# Patient Record
Sex: MALE | Race: WHITE | NOT HISPANIC OR LATINO
[De-identification: names, ages, dates, MRNs, and addresses within clinical notes are randomized per-mention and may not be internally consistent; named-entity substitution may affect disease eponyms.]

---

## 2017-01-10 DIAGNOSIS — E78.00 HYPERCHOLESTEROLEMIA: ICD-10-CM

## 2017-01-16 ENCOUNTER — RX ONLY (OUTPATIENT)
Age: 62
Setting detail: RX ONLY
End: 2017-01-16

## 2017-01-23 ENCOUNTER — TELEPHONE (OUTPATIENT)
Dept: SLEEP MEDICINE | Facility: MEDICAL CENTER | Age: 62
End: 2017-01-23

## 2017-01-23 DIAGNOSIS — G47.33 OSA (OBSTRUCTIVE SLEEP APNEA): ICD-10-CM

## 2017-01-23 NOTE — TELEPHONE ENCOUNTER
Orders placed.  Is this a new order DME companies are requesting that you know of? I have only recently seen these requrest

## 2017-01-23 NOTE — TELEPHONE ENCOUNTER
"Patient's order for CPAP/supplies does not include \"mask of choice.\"  Please sign updated mask order so that patient can exchange his nasal pillows for a full face mask; he notes that he's mouth-breathing.    "

## 2017-02-08 LAB
ALBUMIN SERPL-MCNC: 4.3 G/DL (ref 3.6–4.8)
ALBUMIN/GLOB SERPL: 2.3 {RATIO} (ref 1.1–2.5)
ALP SERPL-CCNC: 61 IU/L (ref 39–117)
ALT SERPL-CCNC: 30 IU/L (ref 0–44)
AST SERPL-CCNC: 25 IU/L (ref 0–40)
BILIRUB SERPL-MCNC: 0.8 MG/DL (ref 0–1.2)
BUN SERPL-MCNC: 15 MG/DL (ref 8–27)
BUN/CREAT SERPL: 14 (ref 10–22)
CALCIUM SERPL-MCNC: 9.2 MG/DL (ref 8.6–10.2)
CHLORIDE SERPL-SCNC: 102 MMOL/L (ref 96–106)
CO2 SERPL-SCNC: 23 MMOL/L (ref 18–29)
CREAT SERPL-MCNC: 1.04 MG/DL (ref 0.76–1.27)
GLOBULIN SER CALC-MCNC: 1.9 G/DL (ref 1.5–4.5)
GLUCOSE SERPL-MCNC: 78 MG/DL (ref 65–99)
POTASSIUM SERPL-SCNC: 4.5 MMOL/L (ref 3.5–5.2)
PROT SERPL-MCNC: 6.2 G/DL (ref 6–8.5)
SODIUM SERPL-SCNC: 139 MMOL/L (ref 134–144)

## 2017-02-27 ENCOUNTER — SLEEP CENTER VISIT (OUTPATIENT)
Dept: SLEEP MEDICINE | Facility: MEDICAL CENTER | Age: 62
End: 2017-02-27
Payer: COMMERCIAL

## 2017-02-27 VITALS
OXYGEN SATURATION: 95 % | RESPIRATION RATE: 16 BRPM | SYSTOLIC BLOOD PRESSURE: 120 MMHG | HEART RATE: 80 BPM | DIASTOLIC BLOOD PRESSURE: 76 MMHG | BODY MASS INDEX: 29.12 KG/M2 | TEMPERATURE: 98.6 F | WEIGHT: 215 LBS | HEIGHT: 72 IN

## 2017-02-27 DIAGNOSIS — G47.33 OSA (OBSTRUCTIVE SLEEP APNEA): ICD-10-CM

## 2017-02-27 DIAGNOSIS — G47.33 OBSTRUCTIVE SLEEP APNEA: ICD-10-CM

## 2017-02-27 PROCEDURE — 99213 OFFICE O/P EST LOW 20 MIN: CPT | Performed by: NURSE PRACTITIONER

## 2017-02-27 NOTE — PATIENT INSTRUCTIONS
1) Continue CPAP at 7cmH20  2) Clean mask and supplies weekly and change them as insurance allows  3) Return in about 3 months (around 5/27/2017) for Compliance, review of symptoms, if not sooner, follow up with EMBER Tate.   4) increase humidity for dry mouth - continue with biotene and increase water intake

## 2017-02-27 NOTE — PROGRESS NOTES
CC:  Here for f/u sleep issues as listed below    HPI:   Keaton presents today for follow up obstructive sleep apnea. He was originally referred due to refractory high blood pressure. He reports the day after sleep study his blood pressure was remarkably lower.  PSG from 11/2016 indicated an AHI of 63.9 and low oxygen of 63%.  Patient was successfully titrated to a CPAP pressure of 7 with reduced AHI, correction of his oxygen, and REM rebound.  Currently he is being treated with CPAP @ 7cmH20.  Compliance download from the dates 1/18/2017 - 2/16/2017 indicates he is wearing the device 97% for an avg of 5 hours and 37 minutes per night with a reduced AHI of 4.7.  He does tolerate pressure and mask well.  He wakes up more refreshed and denies morning H/A now. he sleeps better overall, but continues to wake up to go to the restroom, but lessened overall. Less tired throughout the day. he will continue to clean supplies weekly and change them as insurance allows.       Patient Active Problem List    Diagnosis Date Noted   • NICK (obstructive sleep apnea) 02/27/2017   • Colon polyp 11/29/2016   • Sleep apnea, obstructive 11/21/2016   • Obstructive sleep apnea 10/14/2016   • Essential hypertension, benign 02/23/2016   • Esophageal reflux 02/06/2012   • Hypercholesterolemia 02/06/2012   • Obstructive uropathy 02/06/2012   • Osteoarthritis 02/06/2012       Past Medical History   Diagnosis Date   • Hypertension    • Hypercholesterolemia    • Obstructive uropathy    • Osteoarthritis    • Colon polyp      Adenomatous, ~2005, not noted on most recent colonoscopy    • GERD (gastroesophageal reflux disease)    • Cancer (CMS-HCC)      squamous cell   • Back pain    • Sleep apnea, obstructive 11/21/2016     On CPAP, Dr. Villaseñor   • Arthritis      Multiple joints       Past Surgical History   Procedure Laterality Date   • Colonoscopy       Complete - For Polyo Removal   • Spermatocelectomy       Surgery of Epididymis - Right   •  Vasectomy       Surgery of Male Genitalia   • Tonsillectomy and adenoidectomy     • Inguinal hernia laparoscopic  1/15/2014     Performed by Christophe Isaac M.D. at [facility]ERY [facility]       Family History   Problem Relation Age of Onset   • Heart Disease Mother    • Cancer Mother      Ovarian   • Other Father      Asbestosis   • Heart Disease Father      12/13/10 History of coumadin therapy       Social History     Social History   • Marital Status:      Spouse Name: N/A   • Number of Children: N/A   • Years of Education: N/A     Occupational History   • Not on file.     Social History Main Topics   • Smoking status: Never Smoker    • Smokeless tobacco: Never Used   • Alcohol Use: Yes      Comment: 3 a day    • Drug Use: No   • Sexual Activity: Not on file     Other Topics Concern   • Not on file     Social History Narrative       Current Outpatient Prescriptions   Medication Sig Dispense Refill   • atorvastatin (LIPITOR) 20 MG Tab Take 1 Tab by mouth every day. 90 Tab 3   • tadalafil (CIALIS) 5 MG tablet Take 5 mg by mouth as needed for Erectile Dysfunction.     • hydrocodone/acetaminophen (NORCO)  MG Tab   0   • finasteride (PROSCAR) 5 MG TABS Take 5 mg by mouth every day.     • omeprazole (PRILOSEC) 40 MG capsule Take 40 mg by mouth every day.     • ranitidine (ZANTAC) 150 MG TABS Take 150 mg by mouth as needed.     • Magnesium 250 MG TABS Take 2 Tabs by mouth every day. with chelated zinc     • Multiple Vitamins-Minerals (OCUVITE-LUTEIN) CAPS Take 1 Cap by mouth every day.     • aspirin 81 MG tablet Take 81 mg by mouth every day.     • Calcium Carbonate-Vitamin D (CALTRATE 600+D) 600-400 MG-UNIT TABS Take  by mouth 3 times a day.     • celecoxib (CELEBREX) 200 MG CAPS Take 200 mg by mouth every day.     • docosahexanoic acid (FISH OIL) 1000 MG CAPS Take 1,000 mg by mouth 3 times a day.     • Quercetin 250 MG TABS Take 1 Tab by mouth every day. With Vitamin C, 700mg     • Misc Natural Products  "(TURMERIC CURCUMIN) CAPS Take 1 Cap by mouth every day.     • Cholecalciferol (VITAMIN D3) 2000 UNIT CAPS Take  by mouth every day.     • vitamin e (VITAMIN E) 400 UNIT CAPS Take 400 Units by mouth every day.     • zolpidem (AMBIEN) 10 MG TABS Take 10 mg by mouth. Take 1/2 tablet 4-5 times per week     • lisinopril (PRINIVIL, ZESTRIL) 40 MG tablet Take 1 Tab by mouth every day. 90 Tab 3   • SAW PALMETTO Take 1 Tab by mouth every day. With Zinc, Lycopene, and Pumpkin Seed     • Influenza Vac Typ A&B Surf Ant (FLUVIRIN) INJ by Intramuscular route. For 2012       No current facility-administered medications for this visit.          Allergies: Penicillins; Septra; and Sulfa drugs      ROS   Gen: Denies fever, chills, unintentional weight loss, fatigue  Resp:Denies Dyspnea  CV: Denies chest pain, chest tightness  Sleep:Denies morning headache, insomnia, daytime somnolence, snoring, gasping for air, apnea  Neuro: Denies frequent headaches, weakness, dizziness  See HPI.  All other systems reviewed and negative        Vital signs for this encounter:  Filed Vitals:    02/27/17 1018   Height: 1.829 m (6' 0.01\")   Weight: 97.523 kg (215 lb)   Weight % change since last entry.: 0 %   BP: 120/76   Pulse: 80   BMI (Calculated): 29.15   Resp: 16   Temp: 37 °C (98.6 °F)   O2 sat % room air: 95 %                   Physical Exam:   Gen:         Alert and oriented, No apparent distress.   Neck:        No Lymphadenopathy.  Lungs:     Clear to auscultation bilaterally.    CV:          Regular rate and rhythm. No murmurs, rubs or gallops.   Abd:         Soft non tender, non distended.            Ext:          No clubbing, cyanosis, edema.    Assessment   1. NICK (obstructive sleep apnea)  DME MASK AND SUPPLIES   2. Obstructive sleep apnea         PLAN:   Patient Instructions   1) Continue CPAP at 7cmH20  2) Clean mask and supplies weekly and change them as insurance allows  3) Return in about 3 months (around 5/27/2017) for Compliance, " review of symptoms, if not sooner, follow up with EMBER Tate.

## 2017-02-27 NOTE — MR AVS SNAPSHOT
"        Mark Duane Mercer   2017 10:20 AM   Sleep Center Visit   MRN: [id number]    Department:  [facility]   Dept Phone:  [phone]    Description:  Male : 1955   Provider:  MOIZ Ferro           Reason for Visit     Apnea Last seen 16       Allergies as of 2017     Allergen Noted Reactions    Penicillins 2010       Septra [Bactrim] 2010   Rash    rash    Sulfa Drugs 2010   Rash    rash      You were diagnosed with     NICK (obstructive sleep apnea)   [755874]       Obstructive sleep apnea   [951694]         Vital Signs     Blood Pressure Pulse Temperature Respirations Height Weight    120/76 mmHg 80 37 °C (98.6 °F) 16 1.829 m (6' 0.01\") 97.523 kg (215 lb)    Body Mass Index Oxygen Saturation Smoking Status             29.15 kg/m2 95% Never Smoker          Basic Information     Date Of Birth Sex Race Ethnicity Preferred Language    1955 Male White Non- English      Your appointments     2017  7:40 AM   FOLLOW UP with Kiel Freeman M.D.   [facility]    [address]  [facility] [address] [phone]              Problem List              ICD-10-CM Priority Class Noted - Resolved    Esophageal reflux K21.9   2012 - Present    Hypercholesterolemia E78.00   2012 - Present    Obstructive uropathy N13.9   2012 - Present    Osteoarthritis M19.90   2012 - Present    Essential hypertension, benign I10   2016 - Present    Obstructive sleep apnea G47.33   10/14/2016 - Present    Sleep apnea, obstructive G47.33   2016 - Present    Colon polyp K63.5   2016 - Present    NICK (obstructive sleep apnea) G47.33   2017 - Present      Health Maintenance        Date Due Completion Dates    IMM DTaP/Tdap/Td Vaccine (1 - Tdap) 1974 ---    COLONOSCOPY 2005 ---    IMM ZOSTER VACCINE 2015 ---    IMM INFLUENZA (1) 2016 ---   "            Current Immunizations     No immunizations on file.      Below and/or attached are the medications your provider expects you to take. Review all of your home medications and newly ordered medications with your provider and/or pharmacist. Follow medication instructions as directed by your provider and/or pharmacist. Please keep your medication list with you and share with your provider. Update the information when medications are discontinued, doses are changed, or new medications (including over-the-counter products) are added; and carry medication information at all times in the event of emergency situations     Allergies:  PENICILLINS - (reactions not documented)     SEPTRA - Rash     SULFA DRUGS - Rash               Medications  Valid as of: February 27, 2017 - 11:09 AM    Generic Name Brand Name Tablet Size Instructions for use    Aspirin (Tab) aspirin 81 MG Take 81 mg by mouth every day.        Atorvastatin Calcium (Tab) LIPITOR 20 MG Take 1 Tab by mouth every day.        Calcium Carbonate-Vitamin D (Tab) Calcium Carbonate-Vitamin D 600-400 MG-UNIT Take  by mouth 3 times a day.        Celecoxib (Cap) CELEBREX 200 MG Take 200 mg by mouth every day.        Cholecalciferol (Cap) Vitamin D3 2000 UNIT Take  by mouth every day.        Finasteride (Tab) PROSCAR 5 MG Take 5 mg by mouth every day.        Hydrocodone-Acetaminophen (Tab) NORCO  MG         Influenza Vac Typ A&B Surf Ant (Injection) FLUVIRIN  by Intramuscular route. For 2012        Lisinopril (Tab) PRINIVIL, ZESTRIL 40 MG Take 1 Tab by mouth every day.        Magnesium (Tab) Magnesium 250 MG Take 2 Tabs by mouth every day. with chelated zinc        Misc Natural Products (Cap) Turmeric Curcumin  Take 1 Cap by mouth every day.        Multiple Vitamins-Minerals (Cap) OCUVITE-LUTEIN  Take 1 Cap by mouth every day.        Omega-3 Fatty Acids (Cap) OMEGA 3 FA 1000 MG Take 1,000 mg by mouth 3 times a day.        Omeprazole (CAPSULE DELAYED  RELEASE) PRILOSEC 40 MG Take 40 mg by mouth every day.        Quercetin (Tab) Quercetin 250 MG Take 1 Tab by mouth every day. With Vitamin C, 700mg        RaNITidine HCl (Tab) ZANTAC 150 MG Take 150 mg by mouth as needed.        Saw Palmetto   Take 1 Tab by mouth every day. With Zinc, Lycopene, and Pumpkin Seed        Tadalafil (Tab) CIALIS 5 MG Take 5 mg by mouth as needed for Erectile Dysfunction.        Vitamin E (Cap) VITAMIN E 400 UNIT Take 400 Units by mouth every day.        Zolpidem Tartrate (Tab) AMBIEN 10 MG Take 10 mg by mouth. Take 1/2 tablet 4-5 times per week        .                 Medicines prescribed today were sent to:     [id number] - [place], [place] - [id number] [facility] [address]    Phone: [phone] Fax: [phone]    Open 24 Hours?: No    [facility] - [place], [place] - [address] AT [address] TO [facility]    [address] [facility] [address]    Phone: [phone] Fax: [phone]    Open 24 Hours?: No      Medication refill instructions:       If your prescription bottle indicates you have medication refills left, it is not necessary to call your provider’s office. Please contact your pharmacy and they will refill your medication.    If your prescription bottle indicates you do not have any refills left, you may request refills at any time through one of the following ways: The online [employer] system (except Urgent Care), by calling your provider’s office, or by asking your pharmacy to contact your provider’s office with a refill request. Medication refills are processed only during regular business hours and may not be available until the next business day. Your provider may request additional information or to have a follow-up visit with you prior to refilling your medication.   *Please Note: Medication refills are assigned a new Rx number when refilled electronically. Your pharmacy may indicate that no  refills were authorized even though a new prescription for the same medication is available at the pharmacy. Please request the medicine by name with the pharmacy before contacting your provider for a refill.        Instructions    1) Continue CPAP at 7cmH20  2) Clean mask and supplies weekly and change them as insurance allows  3) Return in about 3 months (around 5/27/2017) for Compliance, review of symptoms, if not sooner, follow up with EMBER Tate.   4) increase humidity for dry mouth - continue with biotene and increase water intake              MyChart Access Code: Activation code not generated  Current MyChart Status: Active

## 2017-04-07 ENCOUNTER — TELEPHONE (OUTPATIENT)
Dept: PULMONOLOGY | Facility: HOSPICE | Age: 62
End: 2017-04-07

## 2017-04-07 NOTE — TELEPHONE ENCOUNTER
Pt states he now has new insurance and needs some assistance sorting out his billing and forms that may need to be signed

## 2017-04-27 ENCOUNTER — HOSPITAL ENCOUNTER (OUTPATIENT)
Dept: RADIOLOGY | Facility: MEDICAL CENTER | Age: 62
End: 2017-04-27
Attending: UROLOGY
Payer: COMMERCIAL

## 2017-04-27 DIAGNOSIS — E29.1 TESTICULAR FAILURE: ICD-10-CM

## 2017-04-27 DIAGNOSIS — N41.1 CHRONIC PROSTATITIS: ICD-10-CM

## 2017-04-27 DIAGNOSIS — R35.0 URINARY FREQUENCY: ICD-10-CM

## 2017-04-27 PROCEDURE — 74178 CT ABD&PLV WO CNTR FLWD CNTR: CPT

## 2017-04-27 PROCEDURE — 700117 HCHG RX CONTRAST REV CODE 255: Performed by: UROLOGY

## 2017-04-27 RX ADMIN — IOHEXOL 100 ML: 350 INJECTION, SOLUTION INTRAVENOUS at 09:25

## 2017-05-15 ENCOUNTER — OFFICE VISIT (OUTPATIENT)
Dept: BEHAVIORAL HEALTH | Facility: PHYSICIAN GROUP | Age: 62
End: 2017-05-15
Payer: COMMERCIAL

## 2017-05-15 DIAGNOSIS — F43.23 ADJUSTMENT DISORDER WITH MIXED ANXIETY AND DEPRESSED MOOD: ICD-10-CM

## 2017-05-15 PROCEDURE — 90791 PSYCH DIAGNOSTIC EVALUATION: CPT | Performed by: MARRIAGE & FAMILY THERAPIST

## 2017-05-15 NOTE — BH THERAPY
"[facility]  INITIAL ASSESSMENT    Name: Mark Duane Mercer  MRN: [id number]  : 1955  Age: 62 y.o.  Date of assessment: 5/15/2017  PCP: Pradip Deutsch M.D.  Persons in attendance: Patient    CHIEF COMPLAINT AND HISTORY OF PRESENTING PROBLEM:  (as stated by Patient):  Mark Duane Mercer is a 62 y.o., White male referred for assessment by No ref. provider found.  Primary presenting issue includes   Chief Complaint   Patient presents with   • Anxiety   .    FAMILY/SOCIAL HISTORY  Current living situation/household members: lives w/ wife of 36 yrs  Relevant family history/structure/dynamics: just retired from [employer] 6 weeks ago, has one maxime who is  to another woman, mom  of cancer  Current family/social stressors: found out he has bladder cancer right after he retired and had surgery 2 wks ago and now will have 6 weeks of chemo starting next week; has always had anxiety; was looking forward to halfway, now \"can't stop thinking about the cancer and what could happen\"  Quality/quantity of current family and/or social support: sister in [place] (my best friend), maxime, wife, who recently retired also  Does patient/parent report a family history of behavioral health issues, diagnoses, or treatment? No  Family History   Problem Relation Age of Onset   • Heart Disease Mother    • Cancer Mother      Ovarian   • Other Father      Asbestosis   • Heart Disease Father      12/13/10 History of coumadin therapy        BEHAVIORAL HEALTH TREATMENT HISTORY  Does patient/parent report a history of prior behavioral health treatment for patient? No:    History of untreated behavioral health issues identified? No    MEDICAL HISTORY  Primary care behavioral health screenings: Patient Health Questionaire                                     If depressive symptoms identified deferred to follow up visit unless specifically addressed in assesment and plan.    Interpretation of PHQ-9 Total Score   Score Severity   1-4 " Minimal Depression   5-9 Mild Depression   10-14 Moderate Depression   15-19 Moderately Severe Depression   20-27 Severe Depression     Past medical/surgical history:   Past Medical History   Diagnosis Date   • Hypertension    • Hypercholesterolemia    • Obstructive uropathy    • Osteoarthritis    • Colon polyp      Adenomatous, ~2005, not noted on most recent colonoscopy    • GERD (gastroesophageal reflux disease)    • Cancer (CMS-[facility])      squamous cell   • Back pain    • Sleep apnea, obstructive 11/21/2016     On CPAP, Dr. Villaseñor   • Arthritis      Multiple joints      Past Surgical History   Procedure Laterality Date   • Colonoscopy       Complete - For Polyo Removal   • Spermatocelectomy       Surgery of Epididymis - Right   • Vasectomy       Surgery of Male Genitalia   • Tonsillectomy and adenoidectomy     • Inguinal hernia laparoscopic  1/15/2014     Performed by Christophe Isaac M.D. at SURGERY [facility]        Medication Allergies:  Penicillins; Septra; and Sulfa drugs     Patient reports last physical exam: unk  Does patient/parent report any history of or current developmental concerns? Yes cancer  Does patient/parent report nutritional concerns? No  Does patient/parent report change in appetite or weight loss/gain? No  Does patient/parent report history of eating disorder symptoms? No  Does patient/parent report dental problem? No  Does patient/parent report physical pain? No   Indicate if pain is acute or chronic, and location: na   Pain scale rating:       Does patient/parent report functional impact of medical, developmental, or pain issues?   yes    EDUCATIONAL  Is patient currently enrolled in a school/educational program?   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 Yes  Does patient identify impact of presenting issue on work functioning? No  Work or income-related stressors:  no     HISTORY:  Does patient report current or past enlistment? No    [If yes, trigger below section]  Does patient report history of exposure to combat? No  Does patient report history of  sexual trauma? No  Does patient report other -related stressors? No    SPIRITUAL/CULTURAL/IDENTITY:  What are the patient’s/family’s spiritual beliefs or practices? none  What is the patient’s cultural or ethnic background/identity? cauc  How does the patient identify their sexual orientation? het  How does the patient identify their gender? male  Does the patient identify any spiritual/cultural/identity factors as relevant to the presenting issue? No    LEGAL HISTORY  Has the patient ever been involved with juvenile, adult, or family legal systems? No   [If yes, trigger section below:]  Does patient report ever being a victim of a crime?  No  Does patient report involvement in any current legal issues?  No  Does patient report ever being arrested or committing a crime? No  Does patient report any current agency (parole/probation/CPS/) involvement? No    ABUSE/NEGLECT/TRAUMA SCREENING  Does patient report feeling “unsafe” in his/her home, or afraid of anyone? No  Does patient report any history of physical, sexual, or emotional abuse? No  Does parent or significant other report any of the above? na  Is there evidence of neglect by self? No  Is there evidence of neglect by a caregiver? No  Does the patient/parent report any history of CPS/APS/police involvement related to suspected abuse/neglect or domestic violence? No  Does the patient/parent report any other history of potentially traumatic life events? No  Based on the information provided during the  current assessment, is a mandated report of suspected abuse/neglect being made?  No     SAFETY ASSESSMENT - SELF  Does patient acknowledge current or past symptoms of dangerousness to self? No  Does parent/significant other report patient has current or past symptoms of dangerousness to self? na      Recent change in frequency/specificity/intensity of suicidal thoughts or self-harm behavior? No  Current access to firearms, medications, or other identified means of suicide/self-harm? na  If yes, willing to restrict access to means of suicide/self-harm? na  Protective factors present: Fear of suicide, Good impulse control, Positive coping skills and Strong family connections    Current Suicide Risk: Low  Crisis Safety Plan completed and copy given to patient: No    SAFETY ASSESSMENT - OTHERS  Does paor past symptoms of aggressive behavior or risk to others? No  Does parent/significant othtient acknowledge current or past symptoms of aggressive behavior or risk to others? No  Does parent/significant other report patient has current or past symptoms of aggressive behavior or risk to others? na    Recent change in frequency/specificity/intensity of thoughts or threats to harm others? No  Current access to firearms/other identified means of harm? No  If yes, willing to restrict access to weapons/means of harm? na  Protective factors present: Good frustration tolerance, Moral/spiritual prohibition, Well-developed sense of empathy, Positive impulse-control and Stable relationships    Current Homicide Risk:  Not applicable  Crisis Safety Plan completed and copy given to patient? No  Based on information provided during the current assessment, is a mandated “duty to warn” being exercised? No    SUBSTANCE USE/ADDICTION HISTORY  [] Not applicable - patient 10 years of age or younger    Is there a family history of substance use/addiction? No  Does patient acknowledge or parent/significant other report use of/dependence on  substances? Yes  Last time patient used alcohol: couple of days  Within the past week? Yes  Last time patient used marijuana: 1970's  Within the past month? No  Any other street drugs ever tried even once? No  Any use of prescription medications/pills without a prescription, or for reasons others than originally prescribed?  No  Any other addictive behavior reported (gambling, shopping, sex)? No     Drug History:  Amphetamine:  Amphetamine frequency: Never used      Cannibis:  Cannabis frequency: Past rare use  Cannabis last use: 1/1/1978      Cocaine:  Cocaine frequency: Never used      Ecstasy:  Ecstasy frequency: Never used      Hallucinogen:  Hallucinogen frequency: Never used      Inhalant:   Inhalant frequency: Never used      Opiate:  Opiate frequency: Never used  Cannabis frequency: Past rare use  Cannabis last use: 1/1/1978      Other:  Other drug frequency: Never used      Sedative:   Sedative frequency: Never used          What consequences does the patient associate with any of the above substance use and or addictive behaviors? None    Patient’s motivation/readiness for change: has decreased alcohol since cancer    [] Patient denies use of any substance/addictive behaviors    STRENGTHS/ASSETS  Strengths Identified by interviewer: Insight into problems, Family suppport and Cognitive flexibility  Strengths Identified by patient: honest, caring    MENTAL STATUS/OBSERVATIONS   Participation: Active verbal participation and Open to feedback  Grooming: Neat  Orientation:Alert   Behavior: Tense  Eye contact: Indirect   Mood:Depressed and Anxious  Affect:Anxious  Thought process: Logical  Thought content:  Within normal limits  Speech: Rate within normal limits  Perception: Within normal limits  Memory: No gross evidence of memory deficits  Insight: Good  Judgment:  Good  Other:    Family/couple interaction observations: na    RESULTS OF SCREENING MEASURES:  [] Not applicable  Measure:   Score:     Measure:  "  Score:       CLINICAL FORMULATION: 61 yo Ke[place] upset, unhappy, scared w/ dx of cancer right after longterm; says he'd been looking forward to leaving job and being able to do what he wanted (nothing in particular, just odd jobs around the house, maybe travel); watched mom die of cancer; does a lot of \"what ifs\" and sees the worst possible outcomes; knows he needs to gather more info about the course of tx and side effects, and not off the Net; disc staying in the what nows      DIAGNOSTIC IMPRESSION(S):  1. Adjustment disorder with mixed anxiety and depressed mood          IDENTIFIED NEEDS/PLAN:  [If any of these marked, trigger DISPOSITION list]  Mood/anxiety and Biomedical  Actively being addressed by [facility]    Does patient express agreement with the above plan? Yes     Referral appointment(s) scheduled? w/ this therapist     KEEGAN Monterroso.        "

## 2017-06-01 ENCOUNTER — OFFICE VISIT (OUTPATIENT)
Dept: BEHAVIORAL HEALTH | Facility: PHYSICIAN GROUP | Age: 62
End: 2017-06-01
Payer: COMMERCIAL

## 2017-06-01 DIAGNOSIS — F43.23 ADJUSTMENT DISORDER WITH MIXED ANXIETY AND DEPRESSED MOOD: ICD-10-CM

## 2017-06-01 PROCEDURE — 90834 PSYTX W PT 45 MINUTES: CPT | Performed by: MARRIAGE & FAMILY THERAPIST

## 2017-06-01 NOTE — BH THERAPY
" [facility]  Therapy Progress Note    Patient Name: Mark Duane Mercer  Patient MRN: [id number]  Today's Date: 6/1/2017     Type of session:Individual psychotherapy  Length of session: 45 minutes  Persons in attendance:Patient    Subjective/New Info: has had to put off chemo because he got a UTI so is more anxious than ever and feels he's behind because the doctor wanted him to get started; won't be able to start now till next week; talked about always having some level of anxiety, just now it's worse; looks \"at the worst thing that can happen always\"; says he thinks he should be \"less of a whiner and strong\"    Objective/Observations:   Participation: Active verbal participation and Open to feedback   Grooming: Casual and Neat   Cognition: Alert   Eye contact: Good   Mood: Depressed and Anxious   Affect: Flexible, Sad and Anxious   Thought process: Logical   Speech: Rate within normal limits   Other:     Diagnoses:   1. Adjustment disorder with mixed anxiety and depressed mood         Current risk:   SUICIDE: Low   Homicide: Not applicable   Self-harm: Not applicable   Relapse: Not applicable has stopped drinking   Other:    Safety Plan reviewed? 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

## 2017-06-12 ENCOUNTER — OFFICE VISIT (OUTPATIENT)
Dept: CARDIOLOGY | Facility: MEDICAL CENTER | Age: 62
End: 2017-06-12
Payer: COMMERCIAL

## 2017-06-12 VITALS
WEIGHT: 210 LBS | SYSTOLIC BLOOD PRESSURE: 130 MMHG | HEART RATE: 62 BPM | HEIGHT: 72 IN | DIASTOLIC BLOOD PRESSURE: 80 MMHG | OXYGEN SATURATION: 96 % | BODY MASS INDEX: 28.44 KG/M2

## 2017-06-12 DIAGNOSIS — E78.00 HYPERCHOLESTEROLEMIA: ICD-10-CM

## 2017-06-12 DIAGNOSIS — I10 ESSENTIAL HYPERTENSION, BENIGN: ICD-10-CM

## 2017-06-12 PROBLEM — G47.33 OSA (OBSTRUCTIVE SLEEP APNEA): Status: RESOLVED | Noted: 2017-02-27 | Resolved: 2017-06-12

## 2017-06-12 PROBLEM — C67.9 BLADDER CANCER (HCC): Status: ACTIVE | Noted: 2017-06-12

## 2017-06-12 PROCEDURE — 99213 OFFICE O/P EST LOW 20 MIN: CPT | Performed by: INTERNAL MEDICINE

## 2017-06-12 RX ORDER — NITROFURANTOIN 25; 75 MG/1; MG/1
CAPSULE ORAL
COMMUNITY
Start: 2017-05-30 | End: 2018-01-16

## 2017-06-12 ASSESSMENT — ENCOUNTER SYMPTOMS
ORTHOPNEA: 0
WHEEZING: 0
PND: 0
FALLS: 0
MYALGIAS: 0
COUGH: 0

## 2017-06-12 ASSESSMENT — LIFESTYLE VARIABLES: SUBSTANCE_ABUSE: 0

## 2017-06-12 NOTE — Clinical Note
[facility]   [address]   [address]  [place] [address]  Phone: [phone]  Fax: [phone]              Mark Duane Mercer  1955    Encounter Date: 6/12/2017    Kiel Freeman M.D.          PROGRESS NOTE:  Subjective:   Mark Duane Mercer is a 62 y.o. male who presents today for follow-up of his lipids and hypertension.  He's had no interval cardiac vascular symptoms at all. He did develop bladder cancer and is on therapy for that now. He is also still having some difficulty adjusting to the mask for his sleep apnea.    Past Medical History   Diagnosis Date   • Hypertension    • Hypercholesterolemia    • Obstructive uropathy    • Osteoarthritis    • Colon polyp      Adenomatous, ~2005, not noted on most recent colonoscopy    • GERD (gastroesophageal reflux disease)    • Cancer (CMS-[facility])      squamous cell   • Back pain    • Sleep apnea, obstructive 11/21/2016     On CPAP, Dr. Villaseñor   • Arthritis      Multiple joints   • Bladder cancer (CMS-HCC) 6/12/2017     Dr. Baron, [facility]      Past Surgical History   Procedure Laterality Date   • Colonoscopy       Complete - For Polyo Removal   • Spermatocelectomy       Surgery of Epididymis - Right   • Vasectomy       Surgery of Male Genitalia   • Tonsillectomy and adenoidectomy     • Inguinal hernia laparoscopic  1/15/2014     Performed by Christophe Isaac M.D. at SURGERY [facility]     Family History   Problem Relation Age of Onset   • Heart Disease Mother    • Cancer Mother      Ovarian   • Other Father      Asbestosis   • Heart Disease Father      12/13/10 History of coumadin therapy     History   Smoking status   • Never Smoker    Smokeless tobacco   • Never Used     Allergies   Allergen Reactions   • Penicillins    • Septra [Bactrim] Rash     rash   • Sulfa Drugs Rash     rash     Outpatient Encounter Prescriptions as of 6/12/2017   Medication Sig Dispense Refill   • atorvastatin  (LIPITOR) 20 MG Tab Take 1 Tab by mouth every day. 90 Tab 3   • lisinopril (PRINIVIL, ZESTRIL) 40 MG tablet Take 1 Tab by mouth every day. 90 Tab 3   • finasteride (PROSCAR) 5 MG TABS Take 5 mg by mouth every day.     • omeprazole (PRILOSEC) 40 MG capsule Take 40 mg by mouth every day.     • Magnesium 250 MG TABS Take 2 Tabs by mouth every day. with chelated zinc     • Multiple Vitamins-Minerals (OCUVITE-LUTEIN) CAPS Take 1 Cap by mouth every day.     • SAW PALMETTO Take 1 Tab by mouth every day. With Zinc, Lycopene, and Pumpkin Seed     • aspirin 81 MG tablet Take 81 mg by mouth every day.     • Calcium Carbonate-Vitamin D (CALTRATE 600+D) 600-400 MG-UNIT TABS Take  by mouth 3 times a day.     • celecoxib (CELEBREX) 200 MG CAPS Take 200 mg by mouth every day.     • docosahexanoic acid (FISH OIL) 1000 MG CAPS Take 1,000 mg by mouth 3 times a day.     • Misc Natural Products (TURMERIC CURCUMIN) CAPS Take 1 Cap by mouth every day.     • Cholecalciferol (VITAMIN D3) 2000 UNIT CAPS Take  by mouth every day.     • vitamin e (VITAMIN E) 400 UNIT CAPS Take 400 Units by mouth every day.     • nitrofurantoin monohydr macro (MACROBID) 100 MG Cap      • tadalafil (CIALIS) 5 MG tablet Take 5 mg by mouth as needed for Erectile Dysfunction.     • hydrocodone/acetaminophen (NORCO)  MG Tab   0   • ranitidine (ZANTAC) 150 MG TABS Take 150 mg by mouth as needed.     • Influenza Vac Typ A&B Surf Ant (FLUVIRIN) INJ by Intramuscular route. For 2012     • Quercetin 250 MG TABS Take 1 Tab by mouth every day. With Vitamin C, 700mg     • zolpidem (AMBIEN) 10 MG TABS Take 10 mg by mouth. Take 1/2 tablet 4-5 times per week       No facility-administered encounter medications on file as of 6/12/2017.     Review of Systems   HENT: Negative for nosebleeds.    Respiratory: Negative for cough and wheezing.    Cardiovascular: Negative for orthopnea and PND.   Musculoskeletal: Negative for myalgias and falls.   Psychiatric/Behavioral: Negative  "for substance abuse.        Objective:   /80 mmHg  Pulse 62  Ht 1.829 m (6' 0.01\")  Wt 95.255 kg (210 lb)  BMI 28.47 kg/m2  SpO2 96%    Physical Exam   Constitutional: He is oriented to person, place, and time. He appears well-developed and well-nourished.   Eyes: Conjunctivae are normal. No scleral icterus.   Neck: No JVD present.   Cardiovascular: Normal rate, regular rhythm and normal heart sounds.  Exam reveals no gallop.    No murmur heard.  Pulmonary/Chest: Effort normal and breath sounds normal.   Musculoskeletal: He exhibits no edema.   Neurological: He is alert and oriented to person, place, and time.   Psychiatric: He has a normal mood and affect. Thought content normal.       Assessment:     1. Essential hypertension, benign     2. Hypercholesterolemia  COMP METABOLIC PANEL    LIPID PROFILE     The above assessed cardiovascular problems are clinically stable.  Medical Decision Making:  Today's Assessment / Status / Plan:     Continue the current cardiovascular regimen.  Continue primary follow up with Dr. Deutsch as well as pulmonary follow-up and follow-up with Dr. Baron.   Cardiology follow up in  6 month(s) and  sooner if needed for any change.   Lab before next visit.  Use of the emergency medical system reviewed.       No Recipients                "

## 2017-06-12 NOTE — PROGRESS NOTES
Subjective:   Mark Duane Mercer is a 62 y.o. male who presents today for follow-up of his lipids and hypertension.  He's had no interval cardiac vascular symptoms at all. He did develop bladder cancer and is on therapy for that now. He is also still having some difficulty adjusting to the mask for his sleep apnea.    Past Medical History   Diagnosis Date   • Hypertension    • Hypercholesterolemia    • Obstructive uropathy    • Osteoarthritis    • Colon polyp      Adenomatous, ~2005, not noted on most recent colonoscopy    • GERD (gastroesophageal reflux disease)    • Cancer (CMS-[facility])      squamous cell   • Back pain    • Sleep apnea, obstructive 11/21/2016     On CPAP, Dr. Villaseñor   • Arthritis      Multiple joints   • Bladder cancer (CMS-HCC) 6/12/2017     Dr. Baron, [facility]      Past Surgical History   Procedure Laterality Date   • Colonoscopy       Complete - For Polyo Removal   • Spermatocelectomy       Surgery of Epididymis - Right   • Vasectomy       Surgery of Male Genitalia   • Tonsillectomy and adenoidectomy     • Inguinal hernia laparoscopic  1/15/2014     Performed by Christophe Isaac M.D. at SURGERY [facility]     Family History   Problem Relation Age of Onset   • Heart Disease Mother    • Cancer Mother      Ovarian   • Other Father      Asbestosis   • Heart Disease Father      12/13/10 History of coumadin therapy     History   Smoking status   • Never Smoker    Smokeless tobacco   • Never Used     Allergies   Allergen Reactions   • Penicillins    • Septra [Bactrim] Rash     rash   • Sulfa Drugs Rash     rash     Outpatient Encounter Prescriptions as of 6/12/2017   Medication Sig Dispense Refill   • atorvastatin (LIPITOR) 20 MG Tab Take 1 Tab by mouth every day. 90 Tab 3   • lisinopril (PRINIVIL, ZESTRIL) 40 MG tablet Take 1 Tab by mouth every day. 90 Tab 3   • finasteride (PROSCAR) 5 MG TABS Take 5 mg by mouth every day.     • omeprazole (PRILOSEC) 40 MG capsule Take 40 mg by mouth every day.     •  "Magnesium 250 MG TABS Take 2 Tabs by mouth every day. with chelated zinc     • Multiple Vitamins-Minerals (OCUVITE-LUTEIN) CAPS Take 1 Cap by mouth every day.     • SAW PALMETTO Take 1 Tab by mouth every day. With Zinc, Lycopene, and Pumpkin Seed     • aspirin 81 MG tablet Take 81 mg by mouth every day.     • Calcium Carbonate-Vitamin D (CALTRATE 600+D) 600-400 MG-UNIT TABS Take  by mouth 3 times a day.     • celecoxib (CELEBREX) 200 MG CAPS Take 200 mg by mouth every day.     • docosahexanoic acid (FISH OIL) 1000 MG CAPS Take 1,000 mg by mouth 3 times a day.     • Misc Natural Products (TURMERIC CURCUMIN) CAPS Take 1 Cap by mouth every day.     • Cholecalciferol (VITAMIN D3) 2000 UNIT CAPS Take  by mouth every day.     • vitamin e (VITAMIN E) 400 UNIT CAPS Take 400 Units by mouth every day.     • nitrofurantoin monohydr macro (MACROBID) 100 MG Cap      • tadalafil (CIALIS) 5 MG tablet Take 5 mg by mouth as needed for Erectile Dysfunction.     • hydrocodone/acetaminophen (NORCO)  MG Tab   0   • ranitidine (ZANTAC) 150 MG TABS Take 150 mg by mouth as needed.     • Influenza Vac Typ A&B Surf Ant (FLUVIRIN) INJ by Intramuscular route. For 2012     • Quercetin 250 MG TABS Take 1 Tab by mouth every day. With Vitamin C, 700mg     • zolpidem (AMBIEN) 10 MG TABS Take 10 mg by mouth. Take 1/2 tablet 4-5 times per week       No facility-administered encounter medications on file as of 6/12/2017.     Review of Systems   HENT: Negative for nosebleeds.    Respiratory: Negative for cough and wheezing.    Cardiovascular: Negative for orthopnea and PND.   Musculoskeletal: Negative for myalgias and falls.   Psychiatric/Behavioral: Negative for substance abuse.        Objective:   /80 mmHg  Pulse 62  Ht 1.829 m (6' 0.01\")  Wt 95.255 kg (210 lb)  BMI 28.47 kg/m2  SpO2 96%    Physical Exam   Constitutional: He is oriented to person, place, and time. He appears well-developed and well-nourished.   Eyes: Conjunctivae are " normal. No scleral icterus.   Neck: No JVD present.   Cardiovascular: Normal rate, regular rhythm and normal heart sounds.  Exam reveals no gallop.    No murmur heard.  Pulmonary/Chest: Effort normal and breath sounds normal.   Musculoskeletal: He exhibits no edema.   Neurological: He is alert and oriented to person, place, and time.   Psychiatric: He has a normal mood and affect. Thought content normal.       Assessment:     1. Essential hypertension, benign     2. Hypercholesterolemia  COMP METABOLIC PANEL    LIPID PROFILE     The above assessed cardiovascular problems are clinically stable.  Medical Decision Making:  Today's Assessment / Status / Plan:     Continue the current cardiovascular regimen.  Continue primary follow up with Dr. Deutsch as well as pulmonary follow-up and follow-up with Dr. Baron.   Cardiology follow up in  6 month(s) and  sooner if needed for any change.   Lab before next visit.  Use of the emergency medical system reviewed.

## 2017-06-12 NOTE — MR AVS SNAPSHOT
"        Mark Duane Mercer   2017 7:40 AM   Office Visit   MRN: [id number]    Department:  [facility]   Dept Phone:  [phone]    Description:  Male : 1955   Provider:  Kiel Freeman M.D.           Reason for Visit     Follow-Up           Allergies as of 2017     Allergen Noted Reactions    Penicillins 2010       Septra [Bactrim] 2010   Rash    rash    Sulfa Drugs 2010   Rash    rash      You were diagnosed with     Essential hypertension, benign   [401.1.ICD-9-CM]       Hypercholesterolemia   [230154]       Malignant neoplasm of trigone of urinary bladder (CMS-HCC)   [188.0.ICD-9-CM]         Vital Signs     Blood Pressure Pulse Height Weight Body Mass Index Oxygen Saturation    130/80 mmHg 62 1.829 m (6' 0.01\") 95.255 kg (210 lb) 28.47 kg/m2 96%    Smoking Status                   Never Smoker            Basic Information     Date Of Birth Sex Race Ethnicity Preferred Language    1955 Male White Non- English      Your appointments     Aug 01, 2017  2:00 PM   Individual Therapy with TRAE Monterroso   [facility]    [address]  [address]  [place] [address]   [phone]            Aug 15, 2017 11:00 AM   Individual Therapy with TRAE Monterroso   [facility]    [address]  [address]  [place] [address]   [phone]            Sep 05, 2017  2:00 PM   Individual Therapy with TRAE Monterroso   [facility]    [address]  [address]  [place] [address]   [phone]            2017  9:00 AM   FOLLOW UP with Kiel Freeman M.D.   [facility]    [address]  [address]  [place] [address] [phone]              Problem List              ICD-10-CM Priority Class Noted - Resolved    Esophageal reflux K21.9   2012 - Present    Hypercholesterolemia " E78.00   2/6/2012 - Present    Obstructive uropathy N13.9   2/6/2012 - Present    Osteoarthritis M19.90   2/6/2012 - Present    Essential hypertension, benign I10   2/23/2016 - Present    Sleep apnea, obstructive G47.33   11/21/2016 - Present    Colon polyp K63.5   11/29/2016 - Present    Adjustment disorder with mixed anxiety and depressed mood F43.23   5/15/2017 - Present    Bladder cancer (CMS-HCC) C67.9   6/12/2017 - Present      Health Maintenance        Date Due Completion Dates    IMM DTaP/Tdap/Td Vaccine (1 - Tdap) 2/28/1974 ---    COLONOSCOPY 2/28/2005 ---    IMM ZOSTER VACCINE 2/28/2015 ---            Current Immunizations     No immunizations on file.      Below and/or attached are the medications your provider expects you to take. Review all of your home medications and newly ordered medications with your provider and/or pharmacist. Follow medication instructions as directed by your provider and/or pharmacist. Please keep your medication list with you and share with your provider. Update the information when medications are discontinued, doses are changed, or new medications (including over-the-counter products) are added; and carry medication information at all times in the event of emergency situations     Allergies:  PENICILLINS - (reactions not documented)     SEPTRA - Rash     SULFA DRUGS - Rash               Medications  Valid as of: June 12, 2017 -  7:58 AM    Generic Name Brand Name Tablet Size Instructions for use    Aspirin (Tab) aspirin 81 MG Take 81 mg by mouth every day.        Atorvastatin Calcium (Tab) LIPITOR 20 MG Take 1 Tab by mouth every day.        Calcium Carbonate-Vitamin D (Tab) Calcium Carbonate-Vitamin D 600-400 MG-UNIT Take  by mouth 3 times a day.        Celecoxib (Cap) CELEBREX 200 MG Take 200 mg by mouth every day.        Cholecalciferol (Cap) Vitamin D3 2000 UNIT Take  by mouth every day.        Finasteride (Tab) PROSCAR 5 MG Take 5 mg by mouth every day.         Hydrocodone-Acetaminophen (Tab) NORCO  MG         Influenza Vac Typ A&B Surf Ant (Injection) FLUVIRIN  by Intramuscular route. For 2012        Lisinopril (Tab) PRINIVIL, ZESTRIL 40 MG Take 1 Tab by mouth every day.        Magnesium (Tab) Magnesium 250 MG Take 2 Tabs by mouth every day. with chelated zinc        Misc Natural Products (Cap) Turmeric Curcumin  Take 1 Cap by mouth every day.        Multiple Vitamins-Minerals (Cap) OCUVITE-LUTEIN  Take 1 Cap by mouth every day.        Nitrofurantoin Monohyd Macro (Cap) MACROBID 100 MG         Omega-3 Fatty Acids (Cap) OMEGA 3 FA 1000 MG Take 1,000 mg by mouth 3 times a day.        Omeprazole (CAPSULE DELAYED RELEASE) PRILOSEC 40 MG Take 40 mg by mouth every day.        Quercetin (Tab) Quercetin 250 MG Take 1 Tab by mouth every day. With Vitamin C, 700mg        RaNITidine HCl (Tab) ZANTAC 150 MG Take 150 mg by mouth as needed.        Saw Palmetto   Take 1 Tab by mouth every day. With Zinc, Lycopene, and Pumpkin Seed        Tadalafil (Tab) CIALIS 5 MG Take 5 mg by mouth as needed for Erectile Dysfunction.        Vitamin E (Cap) VITAMIN E 400 UNIT Take 400 Units by mouth every day.        Zolpidem Tartrate (Tab) AMBIEN 10 MG Take 10 mg by mouth. Take 1/2 tablet 4-5 times per week        .                 Medicines prescribed today were sent to:     [id number] - [place], [place] - [address] [place] [facility].    [address] [place] [address]    Phone: [phone] Fax: [phone]    Open 24 Hours?: No      Medication refill instructions:       If your prescription bottle indicates you have medication refills left, it is not necessary to call your provider’s office. Please contact your pharmacy and they will refill your medication.    If your prescription bottle indicates you do not have any refills left, you may request refills at any time through one of the following ways: The online Easia[employer] system (except Urgent Care), by calling your provider’s office, or by asking  your pharmacy to contact your provider’s office with a refill request. Medication refills are processed only during regular business hours and may not be available until the next business day. Your provider may request additional information or to have a follow-up visit with you prior to refilling your medication.   *Please Note: Medication refills are assigned a new Rx number when refilled electronically. Your pharmacy may indicate that no refills were authorized even though a new prescription for the same medication is available at the pharmacy. Please request the medicine by name with the pharmacy before contacting your provider for a refill.        Your To Do List     Future Labs/Procedures Complete By Expires    COMP METABOLIC PANEL  As directed 6/12/2018    LIPID PROFILE  As directed 6/12/2018         Parallelshart Access Code: Activation code not generated  Current K1 Speed Status: Active

## 2017-08-01 ENCOUNTER — OFFICE VISIT (OUTPATIENT)
Dept: BEHAVIORAL HEALTH | Facility: PHYSICIAN GROUP | Age: 62
End: 2017-08-01
Payer: COMMERCIAL

## 2017-08-01 DIAGNOSIS — F43.23 ADJUSTMENT DISORDER WITH MIXED ANXIETY AND DEPRESSED MOOD: ICD-10-CM

## 2017-08-01 PROCEDURE — 90834 PSYTX W PT 45 MINUTES: CPT | Performed by: MARRIAGE & FAMILY THERAPIST

## 2017-08-01 NOTE — BH THERAPY
[facility]  Therapy Progress Note    Patient Name: Mark Duane Mercer  Patient MRN: [id number]  Today's Date: 8/1/2017     Type of session:Individual psychotherapy  Length of session: 45 minutes  Persons in attendance:Patient    Subjective/New Info: has completed first round of chemo, waiting for scope to see how that was; worries all the time about future; wife is drinking more and he's anxious; can't go to sleep w/out her being in bed and she stays up to drink and eat; says he can't enjoy what he likes to do because he thinks all the time about his physical problems    Objective/Observations:   Participation: Active verbal participation and Open to feedback   Grooming: Casual and Neat   Cognition: Alert   Eye contact: Good   Mood: Depressed and Anxious   Affect: Sad, Anxious and Tearful   Thought process: Logical   Speech: Rate within normal limits   Other:     Diagnoses:   1. Adjustment disorder with mixed anxiety and depressed mood         Current risk:   SUICIDE: Low   Homicide: Not applicable   Self-harm: Not applicable   Relapse: Not applicable   Other:    Safety Plan reviewed? Not Indicated   If evidence of imminent risk is present, intervention/plan:     Therapeutic Intervention(s): Distress tolerance skills, Interpersonal effectiveness skills, Problem-solving, Self-care skills and Supportive psychotherapy    Treatment Goal(s)/Objective(s) addressed: suggested he talk to wife about his concerns about her drinking and remind her that her eating group at [facility] says that eating before bed isn't good; also set aside one hour to worry daily and otherwise put it aside; also since he says he spends a lot of time beating himself up for past mistakes, look at them as rocks and let one of them go at a time     Progress toward Treatment Goals: No change    Plan:as above  - Next appointment scheduled:  8/15/2017    TRAE Monterroso  8/1/2017

## 2017-08-15 ENCOUNTER — OFFICE VISIT (OUTPATIENT)
Dept: BEHAVIORAL HEALTH | Facility: PHYSICIAN GROUP | Age: 62
End: 2017-08-15
Payer: COMMERCIAL

## 2017-08-15 DIAGNOSIS — F43.23 ADJUSTMENT DISORDER WITH MIXED ANXIETY AND DEPRESSED MOOD: ICD-10-CM

## 2017-08-15 PROCEDURE — 90834 PSYTX W PT 45 MINUTES: CPT | Performed by: MARRIAGE & FAMILY THERAPIST

## 2017-08-15 NOTE — BH THERAPY
[facility]  Therapy Progress Note    Patient Name: Mark Duane Mercer  Patient MRN: [id number]  Today's Date: 8/15/2017     Type of session:Individual psychotherapy  Length of session: 45 minutes  Persons in attendance:Patient    Subjective/New Info: sister was here for 4 days, it was a good visit but it was hard to say goodby; says he can't stop focusing on having cancer and that it may come back (has 5 more weeks before scope to see how 1st chemo went); wife continues to drink at night; says he's always been anxious since childhood and worries about past interactions w/ people; suggested that he's learned from them and has changed behavior, now he can let those go    Objective/Observations:   Participation: Active verbal participation   Grooming: Neat   Cognition: Alert   Eye contact: Good   Mood: Depressed and Anxious   Affect: Sad, Anxious and Tearful   Thought process: Logical   Speech: Rate within normal limits   Other:     Diagnoses:   1. Adjustment disorder with mixed anxiety and depressed mood         Current risk:   SUICIDE: Low   Homicide: Not applicable   Self-harm: Not applicable   Relapse: Not applicable   Other:    Safety Plan reviewed? Not Indicated   If evidence of imminent risk is present, intervention/plan:     Therapeutic Intervention(s): Cognitive modification, Conflict clarification, Problem-solving, Self-care skills and Supportive psychotherapy    Treatment Goal(s)/Objective(s) addressed: working on what-ifs but w/out much success; likes the idea of letting go of past situations when he's able to see what he's learned from them     Progress toward Treatment Goals: No change    Plan:  - Next appointment scheduled:  9/5/2017    TRAE Monterroso  8/15/2017

## 2017-09-05 ENCOUNTER — OFFICE VISIT (OUTPATIENT)
Dept: BEHAVIORAL HEALTH | Facility: PHYSICIAN GROUP | Age: 62
End: 2017-09-05
Payer: COMMERCIAL

## 2017-09-05 DIAGNOSIS — F43.23 ADJUSTMENT DISORDER WITH MIXED ANXIETY AND DEPRESSED MOOD: ICD-10-CM

## 2017-09-05 PROCEDURE — 90834 PSYTX W PT 45 MINUTES: CPT | Performed by: MARRIAGE & FAMILY THERAPIST

## 2017-10-09 DIAGNOSIS — E78.00 HYPERCHOLESTEROLEMIA: ICD-10-CM

## 2017-10-09 DIAGNOSIS — I10 ESSENTIAL HYPERTENSION, BENIGN: ICD-10-CM

## 2017-10-13 RX ORDER — LISINOPRIL 40 MG/1
TABLET ORAL
Qty: 90 TAB | Refills: 3 | Status: SHIPPED | OUTPATIENT
Start: 2017-10-13 | End: 2018-10-09 | Stop reason: SDUPTHER

## 2017-10-13 RX ORDER — ATORVASTATIN CALCIUM 20 MG/1
TABLET, FILM COATED ORAL
Qty: 90 TAB | Refills: 3 | Status: SHIPPED | OUTPATIENT
Start: 2017-10-13 | End: 2018-10-15 | Stop reason: SDUPTHER

## 2017-11-06 ENCOUNTER — APPOINTMENT (RX ONLY)
Dept: URBAN - METROPOLITAN AREA CLINIC 4 | Facility: CLINIC | Age: 62
Setting detail: DERMATOLOGY
End: 2017-11-06

## 2017-11-06 DIAGNOSIS — L90.5 SCAR CONDITIONS AND FIBROSIS OF SKIN: ICD-10-CM

## 2017-11-06 DIAGNOSIS — L81.4 OTHER MELANIN HYPERPIGMENTATION: ICD-10-CM

## 2017-11-06 DIAGNOSIS — D22 MELANOCYTIC NEVI: ICD-10-CM

## 2017-11-06 DIAGNOSIS — L20.89 OTHER ATOPIC DERMATITIS: ICD-10-CM

## 2017-11-06 DIAGNOSIS — L82.0 INFLAMED SEBORRHEIC KERATOSIS: ICD-10-CM

## 2017-11-06 DIAGNOSIS — L82.1 OTHER SEBORRHEIC KERATOSIS: ICD-10-CM

## 2017-11-06 PROBLEM — D48.5 NEOPLASM OF UNCERTAIN BEHAVIOR OF SKIN: Status: ACTIVE | Noted: 2017-11-06

## 2017-11-06 PROBLEM — I10 ESSENTIAL (PRIMARY) HYPERTENSION: Status: ACTIVE | Noted: 2017-11-06

## 2017-11-06 PROBLEM — Z85.828 PERSONAL HISTORY OF OTHER MALIGNANT NEOPLASM OF SKIN: Status: ACTIVE | Noted: 2017-11-06

## 2017-11-06 PROBLEM — D22.72 MELANOCYTIC NEVI OF LEFT LOWER LIMB, INCLUDING HIP: Status: ACTIVE | Noted: 2017-11-06

## 2017-11-06 PROBLEM — N40.0 BENIGN PROSTATIC HYPERPLASIA WITHOUT LOWER URINARY TRACT SYMPTOMS: Status: ACTIVE | Noted: 2017-11-06

## 2017-11-06 PROBLEM — L57.0 ACTINIC KERATOSIS: Status: ACTIVE | Noted: 2017-11-06

## 2017-11-06 PROBLEM — D22.5 MELANOCYTIC NEVI OF TRUNK: Status: ACTIVE | Noted: 2017-11-06

## 2017-11-06 PROBLEM — L20.84 INTRINSIC (ALLERGIC) ECZEMA: Status: ACTIVE | Noted: 2017-11-06

## 2017-11-06 PROCEDURE — ? DIAGNOSIS COMMENT

## 2017-11-06 PROCEDURE — ? COUNSELING: TOPICAL STEROIDS

## 2017-11-06 PROCEDURE — ? PATIENT SPECIFIC COUNSELING

## 2017-11-06 PROCEDURE — ? LIQUID NITROGEN

## 2017-11-06 PROCEDURE — ? COUNSELING

## 2017-11-06 PROCEDURE — 17110 DESTRUCTION B9 LES UP TO 14: CPT

## 2017-11-06 PROCEDURE — ? PRESCRIPTION

## 2017-11-06 PROCEDURE — ? OBSERVATION AND MEASURE

## 2017-11-06 PROCEDURE — 11100: CPT | Mod: 59

## 2017-11-06 PROCEDURE — 99214 OFFICE O/P EST MOD 30 MIN: CPT | Mod: 25

## 2017-11-06 PROCEDURE — ? BIOPSY BY SHAVE METHOD

## 2017-11-06 RX ORDER — BETAMETHASONE DIPROPIONATE 0.5 MG/G
CREAM TOPICAL
Qty: 1 | Refills: 2 | Status: ERX | COMMUNITY
Start: 2017-11-06

## 2017-11-06 RX ADMIN — BETAMETHASONE DIPROPIONATE: 0.5 CREAM TOPICAL at 00:00

## 2017-11-06 ASSESSMENT — LOCATION SIMPLE DESCRIPTION DERM
LOCATION SIMPLE: LEFT THIGH
LOCATION SIMPLE: LEFT CALF
LOCATION SIMPLE: RIGHT HAND
LOCATION SIMPLE: RIGHT PRETIBIAL REGION
LOCATION SIMPLE: LEFT LOWER BACK
LOCATION SIMPLE: LEFT PRETIBIAL REGION
LOCATION SIMPLE: ABDOMEN
LOCATION SIMPLE: RIGHT LOWER BACK
LOCATION SIMPLE: LEFT HAND
LOCATION SIMPLE: RIGHT CLAVICULAR SKIN
LOCATION SIMPLE: CHEST
LOCATION SIMPLE: LEFT 3RD TOE
LOCATION SIMPLE: LEFT CHEEK
LOCATION SIMPLE: LEFT KNEE

## 2017-11-06 ASSESSMENT — LOCATION DETAILED DESCRIPTION DERM
LOCATION DETAILED: LEFT ANTERIOR PROXIMAL THIGH
LOCATION DETAILED: RIGHT RIB CAGE
LOCATION DETAILED: LEFT LATERAL INFERIOR CHEST
LOCATION DETAILED: LEFT SUPERIOR LATERAL LOWER BACK
LOCATION DETAILED: LEFT DORSAL 3RD TOE
LOCATION DETAILED: LEFT SUPERIOR LATERAL MALAR CHEEK
LOCATION DETAILED: RIGHT SUPERIOR LATERAL LOWER BACK
LOCATION DETAILED: LEFT RIB CAGE
LOCATION DETAILED: LEFT DISTAL PRETIBIAL REGION
LOCATION DETAILED: LEFT KNEE
LOCATION DETAILED: LEFT INFERIOR LATERAL MIDBACK
LOCATION DETAILED: LEFT CENTRAL MALAR CHEEK
LOCATION DETAILED: PERIUMBILICAL SKIN
LOCATION DETAILED: STERNUM
LOCATION DETAILED: LEFT ULNAR DORSAL HAND
LOCATION DETAILED: LEFT SUPERIOR MEDIAL LOWER BACK
LOCATION DETAILED: RIGHT DISTAL PRETIBIAL REGION
LOCATION DETAILED: LEFT LATERAL SUPERIOR CHEST
LOCATION DETAILED: LEFT DISTAL CALF
LOCATION DETAILED: RIGHT CLAVICULAR SKIN
LOCATION DETAILED: RIGHT MEDIAL INFERIOR CHEST
LOCATION DETAILED: EPIGASTRIC SKIN
LOCATION DETAILED: RIGHT RADIAL DORSAL HAND

## 2017-11-06 ASSESSMENT — LOCATION ZONE DERM
LOCATION ZONE: TRUNK
LOCATION ZONE: HAND
LOCATION ZONE: LEG
LOCATION ZONE: FACE
LOCATION ZONE: TOE

## 2017-11-06 NOTE — PROCEDURE: LIQUID NITROGEN
Detail Level: Detailed
Include Z78.9 (Other Specified Conditions Influencing Health Status) As An Associated Diagnosis?: Yes
Medical Necessity Clause: This procedure was medically necessary because the lesions that were treated were:
Add 52 Modifier (Optional): no
Consent: The patient's consent was obtained including but not limited to risks of crusting, scabbing, blistering, scarring, darker or lighter pigmentary change, recurrence, incomplete removal and infection.
Medical Necessity Information: It is in your best interest to select a reason for this procedure from the list below. All of these items fulfill various CMS LCD requirements except the new and changing color options.
Post-Care Instructions: I reviewed with the patient in detail post-care instructions. Patient is to wear sunprotection, and avoid picking at any of the treated lesions. Pt may apply Vaseline to crusted or scabbing areas.

## 2017-11-06 NOTE — PROCEDURE: OBSERVATION
Detail Level: Detailed
Size Of Lesion: 7mm
Morphology Per Location (Optional): macule
Size Of Lesion: 6mm
Size Of Lesion: 5mm
Morphology Per Location (Optional): centrally dark papule
Size Of Lesion: 4mm
Morphology Per Location (Optional): dark papule
Size Of Lesion: 3mm
Morphology Per Location (Optional): dark macule
Size Of Lesion: 1mm
Size Of Lesion: 2mm

## 2017-11-06 NOTE — PROCEDURE: PATIENT SPECIFIC COUNSELING
Detail Level: Simple
May use 1 % otc hydrocortisone to the face, apply to affected areas twice a day for up to 2 weeks then discontinue.

## 2017-11-06 NOTE — PROCEDURE: BIOPSY BY SHAVE METHOD
Post-Care Instructions: I reviewed with the patient in detail post-care instructions. Patient is to keep the biopsy site dry overnight, and then apply bacitracin twice daily until healed. Patient may apply hydrogen peroxide soaks to remove any crusting.
Hemostasis: Aluminum Chloride
Render Post-Care Instructions In Note?: no
Billing Type: Third-Party Bill
Notification Instructions: Patient will be notified of biopsy results. However, patient instructed to call the office if not contacted within 2 weeks.
Additional Anesthesia Volume In Cc (Will Not Render If 0): 0
Cryotherapy Text: The wound bed was treated with cryotherapy after the biopsy was performed.
Dressing: bandage
Biopsy Method: Personna blade
Electrodesiccation And Curettage Text: The wound bed was treated with electrodesiccation and curettage after the biopsy was performed.
Type Of Destruction Used: Cryotherapy
Wound Care: Petrolatum
Destruction After The Procedure: Yes
Consent: Written consent was obtained and risks were reviewed including but not limited to scarring, infection, bleeding, scabbing, incomplete removal, nerve damage and allergy to anesthesia.
X Size Of Lesion In Cm: 0.8
Anesthesia Type: 1% lidocaine with 1:100,000 epinephrine and 408mcg clindamycin/ml and a 1:10 solution of 8.4% sodium bicarbonate
Curettage Text: The wound bed was treated with curettage after the biopsy was performed.
Detail Level: Detailed
Silver Nitrate Text: The wound bed was treated with silver nitrate after the biopsy was performed.
Biopsy Type: H and E
Lab: [id number]
Electrodesiccation Text: The wound bed was treated with electrodesiccation after the biopsy was performed.
Lab Facility:

## 2017-11-08 LAB
ALBUMIN SERPL-MCNC: 4.3 G/DL (ref 3.6–4.8)
ALBUMIN/GLOB SERPL: 1.9 {RATIO} (ref 1.2–2.2)
ALP SERPL-CCNC: 77 IU/L (ref 39–117)
ALT SERPL-CCNC: 36 IU/L (ref 0–44)
AST SERPL-CCNC: 29 IU/L (ref 0–40)
BILIRUB SERPL-MCNC: 0.7 MG/DL (ref 0–1.2)
BUN SERPL-MCNC: 14 MG/DL (ref 8–27)
BUN/CREAT SERPL: 15 (ref 10–24)
CALCIUM SERPL-MCNC: 9.3 MG/DL (ref 8.6–10.2)
CHLORIDE SERPL-SCNC: 100 MMOL/L (ref 96–106)
CHOLEST SERPL-MCNC: 140 MG/DL (ref 100–199)
CO2 SERPL-SCNC: 24 MMOL/L (ref 18–29)
COMMENT 011824: NORMAL
CREAT SERPL-MCNC: 0.94 MG/DL (ref 0.76–1.27)
GFR SERPLBLD CREATININE-BSD FMLA CKD-EPI: 100 ML/MIN/1.73
GFR SERPLBLD CREATININE-BSD FMLA CKD-EPI: 87 ML/MIN/1.73
GLOBULIN SER CALC-MCNC: 2.3 G/DL (ref 1.5–4.5)
GLUCOSE SERPL-MCNC: 86 MG/DL (ref 65–99)
HDLC SERPL-MCNC: 47 MG/DL
LDLC SERPL CALC-MCNC: 66 MG/DL (ref 0–99)
POTASSIUM SERPL-SCNC: 4.5 MMOL/L (ref 3.5–5.2)
PROT SERPL-MCNC: 6.6 G/DL (ref 6–8.5)
SODIUM SERPL-SCNC: 139 MMOL/L (ref 134–144)
TRIGL SERPL-MCNC: 134 MG/DL (ref 0–149)
VLDLC SERPL CALC-MCNC: 27 MG/DL (ref 5–40)

## 2017-11-09 ENCOUNTER — RX ONLY (OUTPATIENT)
Age: 62
Setting detail: RX ONLY
End: 2017-11-09

## 2017-11-09 RX ORDER — CLOBETASOL PROPIONATE 0.5 MG/G
CREAM TOPICAL
Qty: 1 | Refills: 1 | Status: ERX | COMMUNITY
Start: 2017-11-09

## 2017-11-20 ENCOUNTER — OFFICE VISIT (OUTPATIENT)
Dept: CARDIOLOGY | Facility: MEDICAL CENTER | Age: 62
End: 2017-11-20
Payer: COMMERCIAL

## 2017-11-20 VITALS
SYSTOLIC BLOOD PRESSURE: 122 MMHG | WEIGHT: 214 LBS | BODY MASS INDEX: 28.99 KG/M2 | HEIGHT: 72 IN | HEART RATE: 68 BPM | OXYGEN SATURATION: 97 % | DIASTOLIC BLOOD PRESSURE: 78 MMHG

## 2017-11-20 DIAGNOSIS — I10 ESSENTIAL HYPERTENSION, BENIGN: ICD-10-CM

## 2017-11-20 DIAGNOSIS — E78.00 HYPERCHOLESTEROLEMIA: ICD-10-CM

## 2017-11-20 PROCEDURE — 99213 OFFICE O/P EST LOW 20 MIN: CPT | Performed by: INTERNAL MEDICINE

## 2017-11-20 RX ORDER — INFLUENZA A VIRUS A/IDAHO/07/2018 (H1N1) ANTIGEN (MDCK CELL DERIVED, PROPIOLACTONE INACTIVATED, INFLUENZA A VIRUS A/INDIANA/08/2018 (H3N2) ANTIGEN (MDCK CELL DERIVED, PROPIOLACTONE INACTIVATED), INFLUENZA B VIRUS B/SINGAPORE/INFTT-16-0610/2016 ANTIGEN (MDCK CELL DERIVED, PROPIOLACTONE INACTIVATED), INFLUENZA B VIRUS B/IOWA/06/2017 ANTIGEN (MDCK CELL DERIVED, PROPIOLACTONE INACTIVATED) 15; 15; 15; 15 UG/.5ML; UG/.5ML; UG/.5ML; UG/.5ML
INJECTION, SUSPENSION INTRAMUSCULAR
COMMUNITY
Start: 2017-09-16 | End: 2018-01-16

## 2017-11-20 ASSESSMENT — LIFESTYLE VARIABLES: SUBSTANCE_ABUSE: 0

## 2017-11-20 ASSESSMENT — ENCOUNTER SYMPTOMS
WHEEZING: 0
COUGH: 0
PND: 0
ORTHOPNEA: 0
MYALGIAS: 0

## 2017-11-20 NOTE — PROGRESS NOTES
Subjective:   Mark Duane Mercer is a 62 y.o. male who presents today For follow-up of blood pressure and lipids. He's had no cardiac symptoms at all. His last cystoscopy was satisfactory as well.    Past Medical History:   Diagnosis Date   • Arthritis     Multiple joints   • Back pain    • Bladder cancer (CMS-HCC) 6/12/2017    Dr. Baron, [facility]    • Cancer (CMS-[facility])     squamous cell   • Colon polyp     Adenomatous, ~2005, not noted on most recent colonoscopy    • GERD (gastroesophageal reflux disease)    • Hypercholesterolemia    • Hypertension    • Obstructive uropathy    • Osteoarthritis    • Sleep apnea, obstructive 11/21/2016    On CPAP, Dr. Villaseñor     Past Surgical History:   Procedure Laterality Date   • INGUINAL HERNIA LAPAROSCOPIC  1/15/2014    Performed by Christophe Isaac M.D. at SURGERY [facility]   • COLONOSCOPY      Complete - For Polyo Removal   • SPERMATOCELECTOMY      Surgery of Epididymis - Right   • TONSILLECTOMY AND ADENOIDECTOMY     • VASECTOMY      Surgery of Male Genitalia     Family History   Problem Relation Age of Onset   • Heart Disease Mother    • Cancer Mother      Ovarian   • Other Father      Asbestosis   • Heart Disease Father      12/13/10 History of coumadin therapy     History   Smoking Status   • Never Smoker   Smokeless Tobacco   • Never Used     Allergies   Allergen Reactions   • Penicillins    • Septra [Bactrim] Rash     rash   • Sulfa Drugs Rash     rash     Outpatient Encounter Prescriptions as of 11/20/2017   Medication Sig Dispense Refill   • atorvastatin (LIPITOR) 20 MG Tab TAKE 1 TABLET BY MOUTH EVERY DAY 90 Tab 3   • lisinopril (PRINIVIL, ZESTRIL) 40 MG tablet TAKE 1 TABLET BY MOUTH EVERY DAY 90 Tab 3   • nitrofurantoin monohydr macro (MACROBID) 100 MG Cap      • tadalafil (CIALIS) 5 MG tablet Take 5 mg by mouth as needed for Erectile Dysfunction.     • hydrocodone/acetaminophen (NORCO)  MG Tab   0   • finasteride (PROSCAR) 5 MG TABS Take 5 mg by mouth every  day.     • omeprazole (PRILOSEC) 40 MG capsule Take 40 mg by mouth every day.     • ranitidine (ZANTAC) 150 MG TABS Take 150 mg by mouth as needed.     • Magnesium 250 MG TABS Take 2 Tabs by mouth every day. with chelated zinc     • Multiple Vitamins-Minerals (OCUVITE-LUTEIN) CAPS Take 1 Cap by mouth every day.     • SAW PALMETTO Take 1 Tab by mouth every day. With Zinc, Lycopene, and Pumpkin Seed     • aspirin 81 MG tablet Take 81 mg by mouth every day.     • Calcium Carbonate-Vitamin D (CALTRATE 600+D) 600-400 MG-UNIT TABS Take  by mouth 3 times a day.     • docosahexanoic acid (FISH OIL) 1000 MG CAPS Take 1,000 mg by mouth 3 times a day.     • Influenza Vac Typ A&B Surf Ant (FLUVIRIN) INJ by Intramuscular route. For 2012     • Quercetin 250 MG TABS Take 1 Tab by mouth every day. With Vitamin C, 700mg     • Misc Natural Products (TURMERIC CURCUMIN) CAPS Take 1 Cap by mouth every day.     • Cholecalciferol (VITAMIN D3) 2000 UNIT CAPS Take  by mouth every day.     • vitamin e (VITAMIN E) 400 UNIT CAPS Take 400 Units by mouth every day.     • zolpidem (AMBIEN) 10 MG TABS Take 10 mg by mouth. Take 1/2 tablet 4-5 times per week     • FLUCELVAX QUADRIVALENT 0.5 ML Suspension Prefilled Syringe injection      • celecoxib (CELEBREX) 200 MG CAPS Take 200 mg by mouth every day.       No facility-administered encounter medications on file as of 11/20/2017.      Review of Systems   Respiratory: Negative for cough and wheezing.    Cardiovascular: Negative for orthopnea and PND.   Musculoskeletal: Negative for myalgias.   Psychiatric/Behavioral: Negative for substance abuse.        Objective:   /78   Pulse 68   Ht 1.829 m (6')   Wt 97.1 kg (214 lb)   SpO2 97%   BMI 29.02 kg/m²     Physical Exam   Constitutional: He is oriented to person, place, and time. He appears well-developed and well-nourished.   Eyes: Conjunctivae are normal. No scleral icterus.   Neck: No JVD present.   Cardiovascular: Normal rate, regular  rhythm and normal heart sounds.  Exam reveals no gallop.    No murmur heard.  Pulmonary/Chest: Effort normal and breath sounds normal.   Musculoskeletal: He exhibits no edema.   Neurological: He is alert and oriented to person, place, and time.   Psychiatric: He has a normal mood and affect. Thought content normal.     Most recent lab reviewed.  See flow sheet.   Assessment:     1. Essential hypertension, benign     2. Hypercholesterolemia     The above assessed cardiovascular problems are clinically stable.    Medical Decision Making:  Today's Assessment / Status / Plan:   Continue the current cardiovascular regimen.  Continue primary follow up with  Dr. Deutsch.   Cardiology follow up in 6 months and  sooner if needed for any change. Use of the emergency medical system reviewed.

## 2017-11-20 NOTE — LETTER
[facility]   [address]   [address]  [place] [address]  Phone: [phone]  Fax: [phone]              Mark Duane Mercer  1955    Encounter Date: 11/20/2017    Kiel Freeman M.D.          PROGRESS NOTE:  Subjective:   Mark Duane Mercer is a 62 y.o. male who presents today For follow-up of blood pressure and lipids. He's had no cardiac symptoms at all. His last cystoscopy was satisfactory as well.    Past Medical History:   Diagnosis Date   • Arthritis     Multiple joints   • Back pain    • Bladder cancer (CMS-HCC) 6/12/2017    Dr. Baron, [facility]    • Cancer (CMS-[facility])     squamous cell   • Colon polyp     Adenomatous, ~2005, not noted on most recent colonoscopy    • GERD (gastroesophageal reflux disease)    • Hypercholesterolemia    • Hypertension    • Obstructive uropathy    • Osteoarthritis    • Sleep apnea, obstructive 11/21/2016    On CPAP, Dr. Villaseñor     Past Surgical History:   Procedure Laterality Date   • INGUINAL HERNIA LAPAROSCOPIC  1/15/2014    Performed by Christophe Isaac M.D. at SURGERY [facility]   • COLONOSCOPY      Complete - For Polyo Removal   • SPERMATOCELECTOMY      Surgery of Epididymis - Right   • TONSILLECTOMY AND ADENOIDECTOMY     • VASECTOMY      Surgery of Male Genitalia     Family History   Problem Relation Age of Onset   • Heart Disease Mother    • Cancer Mother      Ovarian   • Other Father      Asbestosis   • Heart Disease Father      12/13/10 History of coumadin therapy     History   Smoking Status   • Never Smoker   Smokeless Tobacco   • Never Used     Allergies   Allergen Reactions   • Penicillins    • Septra [Bactrim] Rash     rash   • Sulfa Drugs Rash     rash     Outpatient Encounter Prescriptions as of 11/20/2017   Medication Sig Dispense Refill   • atorvastatin (LIPITOR) 20 MG Tab TAKE 1 TABLET BY MOUTH EVERY DAY 90 Tab 3   • lisinopril (PRINIVIL, ZESTRIL) 40 MG tablet TAKE 1 TABLET BY  MOUTH EVERY DAY 90 Tab 3   • nitrofurantoin monohydr macro (MACROBID) 100 MG Cap      • tadalafil (CIALIS) 5 MG tablet Take 5 mg by mouth as needed for Erectile Dysfunction.     • hydrocodone/acetaminophen (NORCO)  MG Tab   0   • finasteride (PROSCAR) 5 MG TABS Take 5 mg by mouth every day.     • omeprazole (PRILOSEC) 40 MG capsule Take 40 mg by mouth every day.     • ranitidine (ZANTAC) 150 MG TABS Take 150 mg by mouth as needed.     • Magnesium 250 MG TABS Take 2 Tabs by mouth every day. with chelated zinc     • Multiple Vitamins-Minerals (OCUVITE-LUTEIN) CAPS Take 1 Cap by mouth every day.     • SAW PALMETTO Take 1 Tab by mouth every day. With Zinc, Lycopene, and Pumpkin Seed     • aspirin 81 MG tablet Take 81 mg by mouth every day.     • Calcium Carbonate-Vitamin D (CALTRATE 600+D) 600-400 MG-UNIT TABS Take  by mouth 3 times a day.     • docosahexanoic acid (FISH OIL) 1000 MG CAPS Take 1,000 mg by mouth 3 times a day.     • Influenza Vac Typ A&B Surf Ant (FLUVIRIN) INJ by Intramuscular route. For 2012     • Quercetin 250 MG TABS Take 1 Tab by mouth every day. With Vitamin C, 700mg     • Misc Natural Products (TURMERIC CURCUMIN) CAPS Take 1 Cap by mouth every day.     • Cholecalciferol (VITAMIN D3) 2000 UNIT CAPS Take  by mouth every day.     • vitamin e (VITAMIN E) 400 UNIT CAPS Take 400 Units by mouth every day.     • zolpidem (AMBIEN) 10 MG TABS Take 10 mg by mouth. Take 1/2 tablet 4-5 times per week     • FLUCELVAX QUADRIVALENT 0.5 ML Suspension Prefilled Syringe injection      • celecoxib (CELEBREX) 200 MG CAPS Take 200 mg by mouth every day.       No facility-administered encounter medications on file as of 11/20/2017.      Review of Systems   Respiratory: Negative for cough and wheezing.    Cardiovascular: Negative for orthopnea and PND.   Musculoskeletal: Negative for myalgias.   Psychiatric/Behavioral: Negative for substance abuse.        Objective:   /78   Pulse 68   Ht 1.829 m (6')   Wt  97.1 kg (214 lb)   SpO2 97%   BMI 29.02 kg/m²      Physical Exam   Constitutional: He is oriented to person, place, and time. He appears well-developed and well-nourished.   Eyes: Conjunctivae are normal. No scleral icterus.   Neck: No JVD present.   Cardiovascular: Normal rate, regular rhythm and normal heart sounds.  Exam reveals no gallop.    No murmur heard.  Pulmonary/Chest: Effort normal and breath sounds normal.   Musculoskeletal: He exhibits no edema.   Neurological: He is alert and oriented to person, place, and time.   Psychiatric: He has a normal mood and affect. Thought content normal.     Most recent lab reviewed.  See flow sheet.   Assessment:     1. Essential hypertension, benign     2. Hypercholesterolemia     The above assessed cardiovascular problems are clinically stable.    Medical Decision Making:  Today's Assessment / Status / Plan:   Continue the current cardiovascular regimen.  Continue primary follow up with  Dr. Deutsch.   Cardiology follow up in 6 months and  sooner if needed for any change. Use of the emergency medical system reviewed.       Pradip Deutsch M.D.  [address]  [place] [address]  VIA Facsimile: [phone]     Kiel Villaseñor M.D.  [address]  [address]  [place] [address]  VIA In Basket     Jamaal Baron M.D.  [address] [place] [address]  VIA Facsimile: [phone]

## 2018-01-16 DIAGNOSIS — Z01.810 PRE-OPERATIVE CARDIOVASCULAR EXAMINATION: ICD-10-CM

## 2018-01-16 DIAGNOSIS — Z01.812 PRE-OPERATIVE LABORATORY EXAMINATION: ICD-10-CM

## 2018-01-16 LAB
ALBUMIN SERPL BCP-MCNC: 4.2 G/DL (ref 3.2–4.9)
ALBUMIN/GLOB SERPL: 1.8 G/DL
ALP SERPL-CCNC: 64 U/L (ref 30–99)
ALT SERPL-CCNC: 25 U/L (ref 2–50)
ANION GAP SERPL CALC-SCNC: 7 MMOL/L (ref 0–11.9)
APPEARANCE UR: CLEAR
AST SERPL-CCNC: 20 U/L (ref 12–45)
BASOPHILS # BLD AUTO: 1 % (ref 0–1.8)
BASOPHILS # BLD: 0.07 K/UL (ref 0–0.12)
BILIRUB SERPL-MCNC: 0.8 MG/DL (ref 0.1–1.5)
BILIRUB UR QL STRIP.AUTO: NEGATIVE
BUN SERPL-MCNC: 13 MG/DL (ref 8–22)
CALCIUM SERPL-MCNC: 9.1 MG/DL (ref 8.5–10.5)
CHLORIDE SERPL-SCNC: 104 MMOL/L (ref 96–112)
CO2 SERPL-SCNC: 26 MMOL/L (ref 20–33)
COLOR UR: YELLOW
CREAT SERPL-MCNC: 0.89 MG/DL (ref 0.5–1.4)
EKG IMPRESSION: NORMAL
EOSINOPHIL # BLD AUTO: 0.28 K/UL (ref 0–0.51)
EOSINOPHIL NFR BLD: 4.1 % (ref 0–6.9)
ERYTHROCYTE [DISTWIDTH] IN BLOOD BY AUTOMATED COUNT: 51.5 FL (ref 35.9–50)
GLOBULIN SER CALC-MCNC: 2.3 G/DL (ref 1.9–3.5)
GLUCOSE SERPL-MCNC: 82 MG/DL (ref 65–99)
GLUCOSE UR STRIP.AUTO-MCNC: NEGATIVE MG/DL
HCT VFR BLD AUTO: 47.1 % (ref 42–52)
HGB BLD-MCNC: 15.2 G/DL (ref 14–18)
IMM GRANULOCYTES # BLD AUTO: 0.06 K/UL (ref 0–0.11)
IMM GRANULOCYTES NFR BLD AUTO: 0.9 % (ref 0–0.9)
KETONES UR STRIP.AUTO-MCNC: NEGATIVE MG/DL
LEUKOCYTE ESTERASE UR QL STRIP.AUTO: NEGATIVE
LYMPHOCYTES # BLD AUTO: 1.56 K/UL (ref 1–4.8)
LYMPHOCYTES NFR BLD: 23.1 % (ref 22–41)
MCH RBC QN AUTO: 29.4 PG (ref 27–33)
MCHC RBC AUTO-ENTMCNC: 32.3 G/DL (ref 33.7–35.3)
MCV RBC AUTO: 91.1 FL (ref 81.4–97.8)
MICRO URNS: NORMAL
MONOCYTES # BLD AUTO: 0.74 K/UL (ref 0–0.85)
MONOCYTES NFR BLD AUTO: 11 % (ref 0–13.4)
NEUTROPHILS # BLD AUTO: 4.04 K/UL (ref 1.82–7.42)
NEUTROPHILS NFR BLD: 59.9 % (ref 44–72)
NITRITE UR QL STRIP.AUTO: NEGATIVE
NRBC # BLD AUTO: 0 K/UL
NRBC BLD-RTO: 0 /100 WBC
PH UR STRIP.AUTO: 7 [PH]
PLATELET # BLD AUTO: 275 K/UL (ref 164–446)
PMV BLD AUTO: 10.2 FL (ref 9–12.9)
POTASSIUM SERPL-SCNC: 4.1 MMOL/L (ref 3.6–5.5)
PROT SERPL-MCNC: 6.5 G/DL (ref 6–8.2)
PROT UR QL STRIP: NEGATIVE MG/DL
RBC # BLD AUTO: 5.17 M/UL (ref 4.7–6.1)
RBC UR QL AUTO: NEGATIVE
SODIUM SERPL-SCNC: 137 MMOL/L (ref 135–145)
SP GR UR STRIP.AUTO: 1.01
UROBILINOGEN UR STRIP.AUTO-MCNC: 0.2 MG/DL
WBC # BLD AUTO: 6.8 K/UL (ref 4.8–10.8)

## 2018-01-16 PROCEDURE — 93010 ELECTROCARDIOGRAM REPORT: CPT | Performed by: INTERNAL MEDICINE

## 2018-01-16 PROCEDURE — 93005 ELECTROCARDIOGRAM TRACING: CPT

## 2018-01-16 PROCEDURE — 85025 COMPLETE CBC W/AUTO DIFF WBC: CPT

## 2018-01-16 PROCEDURE — 87086 URINE CULTURE/COLONY COUNT: CPT

## 2018-01-16 PROCEDURE — 80053 COMPREHEN METABOLIC PANEL: CPT

## 2018-01-16 PROCEDURE — 81003 URINALYSIS AUTO W/O SCOPE: CPT

## 2018-01-16 PROCEDURE — 36415 COLL VENOUS BLD VENIPUNCTURE: CPT

## 2018-01-16 RX ORDER — ASCORBIC ACID 500 MG
500 TABLET ORAL DAILY
COMMUNITY

## 2018-01-18 LAB
BACTERIA UR CULT: NORMAL
SIGNIFICANT IND 70042: NORMAL
SITE SITE: NORMAL
SOURCE SOURCE: NORMAL

## 2018-01-24 NOTE — PROGRESS NOTES
"Pharmacy Chemotherapy Verification  Patient Name: Keaton Hayward  Dx: Bladder CA  Regimen: Mitomycin intravesical bladder instillation   Guideline for the management of nonmuscle invasive bladder cancer (stages Ta, T1, and Tis): 2007 update.   Masters MC, Edd SS, Pam G, Lottie RS, Sandro ARGUELLESD, Lena EC, Valentin JS Jr, Aracelis PF   J Urol. 2007;178(6):2314.    Allergies:Penicillins; Septra [bactrim]; and Sulfa drugs  Ht 1.854 m (6' 1\")   Wt 99.2 kg (218 lb 11.1 oz)   BMI 28.85 kg/m²   Body surface area is 2.26 meters squared.    LABS: Not required     1. Mitomycin intravesical (bladder instillation) dispense 40 mg/40ml sterile water to OR in cath tipped syringe for procedure on 1/25/18  Fixed dose, NO calculations required     Harriet Middleton, PharmD, BCOP        "

## 2018-01-25 ENCOUNTER — HOSPITAL ENCOUNTER (OUTPATIENT)
Facility: MEDICAL CENTER | Age: 63
End: 2018-01-25
Attending: UROLOGY | Admitting: UROLOGY
Payer: COMMERCIAL

## 2018-01-25 VITALS
HEIGHT: 73 IN | DIASTOLIC BLOOD PRESSURE: 83 MMHG | SYSTOLIC BLOOD PRESSURE: 125 MMHG | TEMPERATURE: 98.9 F | RESPIRATION RATE: 17 BRPM | OXYGEN SATURATION: 95 % | HEART RATE: 62 BPM | WEIGHT: 216.05 LBS | BODY MASS INDEX: 28.63 KG/M2

## 2018-01-25 DIAGNOSIS — C67.4 MALIGNANT NEOPLASM OF POSTERIOR WALL OF URINARY BLADDER (HCC): ICD-10-CM

## 2018-01-25 PROCEDURE — 160039 HCHG SURGERY MINUTES - EA ADDL 1 MIN LEVEL 3: Performed by: UROLOGY

## 2018-01-25 PROCEDURE — 501329 HCHG SET, CYSTO IRRIG Y TUR: Performed by: UROLOGY

## 2018-01-25 PROCEDURE — 160025 RECOVERY II MINUTES (STATS): Performed by: UROLOGY

## 2018-01-25 PROCEDURE — 501138 HCHG PLUG, FOLEY CATH: Performed by: UROLOGY

## 2018-01-25 PROCEDURE — 88307 TISSUE EXAM BY PATHOLOGIST: CPT

## 2018-01-25 PROCEDURE — 700111 HCHG RX REV CODE 636 W/ 250 OVERRIDE (IP)

## 2018-01-25 PROCEDURE — 160035 HCHG PACU - 1ST 60 MINS PHASE I: Performed by: UROLOGY

## 2018-01-25 PROCEDURE — 700102 HCHG RX REV CODE 250 W/ 637 OVERRIDE(OP)

## 2018-01-25 PROCEDURE — 500042 HCHG BAG, URINARY DRAINAGE (CLOSED): Performed by: UROLOGY

## 2018-01-25 PROCEDURE — 160047 HCHG PACU  - EA ADDL 30 MINS PHASE II: Performed by: UROLOGY

## 2018-01-25 PROCEDURE — A4338 INDWELLING CATHETER LATEX: HCPCS | Performed by: UROLOGY

## 2018-01-25 PROCEDURE — 160028 HCHG SURGERY MINUTES - 1ST 30 MINS LEVEL 3: Performed by: UROLOGY

## 2018-01-25 PROCEDURE — 700101 HCHG RX REV CODE 250

## 2018-01-25 PROCEDURE — A9270 NON-COVERED ITEM OR SERVICE: HCPCS

## 2018-01-25 PROCEDURE — 160046 HCHG PACU - 1ST 60 MINS PHASE II: Performed by: UROLOGY

## 2018-01-25 PROCEDURE — 160009 HCHG ANES TIME/MIN: Performed by: UROLOGY

## 2018-01-25 PROCEDURE — 160002 HCHG RECOVERY MINUTES (STAT): Performed by: UROLOGY

## 2018-01-25 PROCEDURE — 500879 HCHG PACK, CYSTO: Performed by: UROLOGY

## 2018-01-25 PROCEDURE — 500354 HCHG CUTTING LOOP: Performed by: UROLOGY

## 2018-01-25 PROCEDURE — 51798 US URINE CAPACITY MEASURE: CPT

## 2018-01-25 PROCEDURE — 700101 HCHG RX REV CODE 250: Performed by: UROLOGY

## 2018-01-25 PROCEDURE — 160048 HCHG OR STATISTICAL LEVEL 1-5: Performed by: UROLOGY

## 2018-01-25 RX ORDER — LIDOCAINE HYDROCHLORIDE 10 MG/ML
0.5 INJECTION, SOLUTION INFILTRATION; PERINEURAL
Status: DISCONTINUED | OUTPATIENT
Start: 2018-01-25 | End: 2018-01-25 | Stop reason: HOSPADM

## 2018-01-25 RX ORDER — PHENAZOPYRIDINE HYDROCHLORIDE 200 MG/1
TABLET, FILM COATED ORAL
Status: COMPLETED
Start: 2018-01-25 | End: 2018-01-25

## 2018-01-25 RX ORDER — LIDOCAINE AND PRILOCAINE 25; 25 MG/G; MG/G
1 CREAM TOPICAL
Status: DISCONTINUED | OUTPATIENT
Start: 2018-01-25 | End: 2018-01-25 | Stop reason: HOSPADM

## 2018-01-25 RX ORDER — OXYCODONE HYDROCHLORIDE AND ACETAMINOPHEN 5; 325 MG/1; MG/1
1-2 TABLET ORAL EVERY 6 HOURS PRN
Qty: 8 TAB | Refills: 0 | Status: SHIPPED | OUTPATIENT
Start: 2018-01-25 | End: 2018-01-27

## 2018-01-25 RX ORDER — LIDOCAINE HYDROCHLORIDE 10 MG/ML
INJECTION, SOLUTION INFILTRATION; PERINEURAL
Status: COMPLETED
Start: 2018-01-25 | End: 2018-01-25

## 2018-01-25 RX ORDER — PHENAZOPYRIDINE HYDROCHLORIDE 100 MG/1
200 TABLET, FILM COATED ORAL ONCE
Status: COMPLETED | OUTPATIENT
Start: 2018-01-25 | End: 2018-01-25

## 2018-01-25 RX ORDER — SODIUM CHLORIDE, SODIUM LACTATE, POTASSIUM CHLORIDE, CALCIUM CHLORIDE 600; 310; 30; 20 MG/100ML; MG/100ML; MG/100ML; MG/100ML
INJECTION, SOLUTION INTRAVENOUS CONTINUOUS
Status: DISCONTINUED | OUTPATIENT
Start: 2018-01-25 | End: 2018-01-25 | Stop reason: HOSPADM

## 2018-01-25 RX ADMIN — FENTANYL CITRATE 50 MCG: 50 INJECTION, SOLUTION INTRAMUSCULAR; INTRAVENOUS at 12:20

## 2018-01-25 RX ADMIN — PHENAZOPYRIDINE HYDROCHLORIDE 200 MG: 100 TABLET, FILM COATED ORAL at 11:35

## 2018-01-25 RX ADMIN — FENTANYL CITRATE 50 MCG: 50 INJECTION, SOLUTION INTRAMUSCULAR; INTRAVENOUS at 11:16

## 2018-01-25 RX ADMIN — FENTANYL CITRATE 50 MCG: 50 INJECTION, SOLUTION INTRAMUSCULAR; INTRAVENOUS at 08:00

## 2018-01-25 RX ADMIN — SODIUM CHLORIDE, SODIUM LACTATE, POTASSIUM CHLORIDE, CALCIUM CHLORIDE: 600; 310; 30; 20 INJECTION, SOLUTION INTRAVENOUS at 05:45

## 2018-01-25 RX ADMIN — PHENAZOPYRIDINE 200 MG: 200 TABLET ORAL at 11:35

## 2018-01-25 RX ADMIN — LIDOCAINE HYDROCHLORIDE 0.5 ML: 10 INJECTION, SOLUTION INFILTRATION; PERINEURAL at 05:45

## 2018-01-25 RX ADMIN — HYDROCODONE BITARTRATE AND ACETAMINOPHEN 15 ML: 2.5; 108 SOLUTION ORAL at 08:00

## 2018-01-25 ASSESSMENT — PAIN SCALES - GENERAL
PAINLEVEL_OUTOF10: 3
PAINLEVEL_OUTOF10: 2
PAINLEVEL_OUTOF10: 5
PAINLEVEL_OUTOF10: 2
PAINLEVEL_OUTOF10: 4
PAINLEVEL_OUTOF10: 2
PAINLEVEL_OUTOF10: 5
PAINLEVEL_OUTOF10: 1
PAINLEVEL_OUTOF10: 0
PAINLEVEL_OUTOF10: 3
PAINLEVEL_OUTOF10: 1

## 2018-01-25 NOTE — OR SURGEON
Immediate Post OP Note    PreOp Diagnosis: bladder cancer    PostOp Diagnosis: same    Procedure(s):  TRANS URETHRAL RESECTION BLADDER- TUMOR WITH INTR- OP MITOMYCIN - Wound Class: Clean Contaminated    Surgeon(s):  Jamaal Baron M.D.    Anesthesiologist/Type of Anesthesia:  Anesthesiologist: Gee Muir M.D./General    Surgical Staff:  Circulator: Clotilde Nuno R.N.  Scrub Person: Soni Blanchard    Specimens:  * No specimens in log *    Estimated Blood Loss: min    Findings: papillary/sessile tumor 1cm posterior wall    Complications: none        1/25/2018 7:43 AM Jamaal Baron

## 2018-01-25 NOTE — OP REPORT
DATE OF SERVICE:  01/25/2018    NAME OF OPERATION:  Transurethral resection of bladder tumor, 1 cm with   postoperative mitomycin.    PREOPERATIVE DIAGNOSIS:  Bladder cancer.    POSTOPERATIVE DIAGNOSIS:  Bladder cancer.    PRIMARY SURGEON:  Jamaal Baron MD    ANESTHESIOLOGIST:  Gee Muir MD    IV FLUIDS:  Crystalloid.    ESTIMATED BLOOD LOSS:  Minimal.    FINDINGS:  Papillary/sessile appearing tumor of the posterior wall.    Completely resected with care to include deep muscles within.  No perforation.    A 40 mg of mitomycin instilled postoperatively.    INDICATIONS:  Briefly, the patient is a 62-year-old gentleman with a history   of known bladder cancer.  He is status post BCG treatments.  On surveillance   cystoscopy in the office, he was found to have a recurrence.  Indications were   for repeat resection in the operating room.  Informed consent has been   obtained.    OPERATION IN DETAIL:  The patient was taken to the operating room, placed on   the operating table in supine position.  After administration of general   anesthetic, he was placed in lithotomy.  Genitals were prepped and draped   sterilely.  A resectoscope was passed in the bladder with visualizing   obturator.  The prostate was nonobstructing.  We did have an elevated bladder   neck.  Cystoscopy was performed.  The only abnormal lesion was that noted on   the posterior wall.    A monopolar loop was then employed to resect the tumor with care to include   some deep specimen.  This was passed off.  Additional samples of the tumor bed   were taken for a second specimen of muscle only.    On inspection, there was no evidence of residual tumor, no evidence of   perforation.  The tumor bed was completely cauterized.  The bladder was rinsed   several times, all specimens had been removed.    The scope was removed and an 18-T[place] Burks catheter was placed.  Bladder was   irrigated with saline and completely drained.  A 40 mg of  mitomycin and 40 mL   of saline was instilled in the bladder to allow to dwell for 1 hour   postoperatively with the catheter plug.  He tolerated procedure well and was   taken to recovery room in stable condition.       ____________________________________     Jamaal Baron MD MCM / LUCIANA    DD:  01/25/2018 07:50:24  DT:  01/25/2018 09:06:06    D#:  [id number]  Job#:  [id number]

## 2018-01-25 NOTE — OR NURSING
Drained patient's bladder of mitomycin at 0845 per Dr. Torres's orders and removed pickens catheter. Discussed discharge instructions with patient and his wife, Savanah, and they expressed understanding. Patient states he feels ready to go home but would like to try to urinate before going home because he has had problems in the past. Once patient is able to urinate he will be discharged home.

## 2018-01-25 NOTE — OR NURSING
Patient has voided several times but feels like he is only voiding a few ounces at a time. Bladder scan shows 200 ml and patient still feels like he wants to stay until he is able to completely empty his bladder.

## 2018-01-25 NOTE — OR NURSING
Patient is having difficulty urinating and states he is having a lot of burning when he tries to void and then is unable to void. Dr. Torres notified. Order received for one time dose of pyridium 200 mg and patient should be sent home with catheter if bladder scan is over 500ml and is unable to void.

## 2018-01-25 NOTE — PROGRESS NOTES
"Pharmacy chemotherapy verification:    Protocol: mitomycin for bladder irrigation  *Dosing Reference*   Regimen: Mitomycin intravesical bladder instillation   Guideline for the management of nonmuscle invasive bladder cancer (stages Ta, T1, and Tis): 2007 update.   Masters MC, Edd SS, Pam G, Lottie RS, Sandro [place], Lena EC, Valentin JS Jr, Aracelis PF   J Urol. 2007;178(6):2314.    Dx: Bladder tumor     Allergies: Penicillins; Septra [bactrim]; and Sulfa drugs    Ht 1.854 m (6' 1\")   Wt 99.2 kg (218 lb 11.1 oz)   BMI 28.85 kg/m²   Body surface area is 2.26 meters squared.    No labs required      Drug Order   (Drug name, dose, route, IV Fluid & volume, frequency, number of doses)     Medication = mitomycin   Base Dose= 40 mg fixed dose   No calculation required   Final Dose = 40 mg   Route = intravesical  Fluid & Volume = syringe 40 mL SWIFI   Admin Duration = by MD at time of surgical procedure      Fixed dose no calculation required     By my signature below, I confirm this process was performed independently with the BSA and all final chemotherapy dosing calculations congruent. I have reviewed the above chemotherapy order and that my calculation of the final dose and BSA (when applicable) corroborate those calculations of the  pharmacist. Discrepancies of 5% or greater have been addressed and documented within the [facility] Progress notes.         Nanci Rosas, PharmD      "

## 2018-01-25 NOTE — DISCHARGE INSTRUCTIONS
ACTIVITY: Rest and take it easy for the first 24 hours.  A responsible adult is recommended to remain with you during that time.  It is normal to feel sleepy.  We encourage you to not do anything that requires balance, judgment or coordination.    MILD FLU-LIKE SYMPTOMS ARE NORMAL. YOU MAY EXPERIENCE GENERALIZED MUSCLE ACHES, THROAT IRRITATION, HEADACHE AND/OR SOME NAUSEA.    FOR 24 HOURS DO NOT:  Drive, operate machinery or run household appliances.  Drink beer or alcoholic beverages.   Make important decisions or sign legal documents.    SPECIAL INSTRUCTIONS:   Drink plenty of fluids to stay hydrated  Flush the toilet twice after urinating    DIET: To avoid nausea, slowly advance diet as tolerated, avoiding spicy or greasy foods for the first day.  Add more substantial food to your diet according to your physician's instructions.  Babies can be fed formula or breast milk as soon as they are hungry.  INCREASE FLUIDS AND FIBER TO AVOID CONSTIPATION.    SURGICAL DRESSING/BATHING: n/a    FOLLOW-UP APPOINTMENT:  A follow-up appointment should be arranged with your doctor; call to schedule.    You should CALL YOUR PHYSICIAN if you develop:  Fever greater than 101 degrees F.  Pain not relieved by medication, or persistent nausea or vomiting.  Excessive bleeding (blood soaking through dressing) or unexpected drainage from the wound.  Extreme redness or swelling around the incision site, drainage of pus or foul smelling drainage.  Inability to urinate or empty your bladder within 8 hours.  Problems with breathing or chest pain.    You should call 911 if you develop problems with breathing or chest pain.  If you are unable to contact your doctor or surgical center, you should go to the nearest emergency room or urgent care center.  Physician's telephone #: [phone]    If any questions arise, call your doctor.  If your doctor is not available, please feel free to call the Surgical Center at [phone].  The Center  is open Monday through Friday from 7AM to 7PM.  You can also call the HEALTH HOTLINE open 24 hours/day, 7 days/week and speak to a nurse at [phone], or toll free at [phone].    A registered nurse may call you a few days after your surgery to see how you are doing after your procedure.    MEDICATIONS: Resume taking daily medication.  Take prescribed pain medication with food.  If no medication is prescribed, you may take non-aspirin pain medication if needed.  PAIN MEDICATION CAN BE VERY CONSTIPATING.  Take a stool softener or laxative such as senokot, pericolace, or milk of magnesia if needed.    Prescription given for Percocet.  Last pain medication given at 8:00 am. (Next dose may be taken at 2:00 pm)    If your physician has prescribed pain medication that includes Acetaminophen (Tylenol), do not take additional Acetaminophen (Tylenol) while taking the prescribed medication.    Depression / Suicide Risk    As you are discharged from this [facility] facility, it is important to learn how to keep safe from harming yourself.    Recognize the warning signs:  · Abrupt changes in personality, positive or negative- including increase in energy   · Giving away possessions  · Change in eating patterns- significant weight changes-  positive or negative  · Change in sleeping patterns- unable to sleep or sleeping all the time   · Unwillingness or inability to communicate  · Depression  · Unusual sadness, discouragement and loneliness  · Talk of wanting to die  · Neglect of personal appearance   · Rebelliousness- reckless behavior  · Withdrawal from people/activities they love  · Confusion- inability to concentrate     If you or a loved one observes any of these behaviors or has concerns about self-harm, here's what you can do:  · Talk about it- your feelings and reasons for harming yourself  · Remove any means that you might use to hurt yourself (examples: pills, rope, extension cords, firearm)  · Get  professional help from the community (Mental Health, Substance Abuse, psychological counseling)  · Do not be alone:Call your Safe Contact- someone whom you trust who will be there for you.  · Call your local CRISIS HOTLINE 519-5314 or 655-200-0513  · Call your local Children's Mobile Crisis Response Team [place] (817) 077-3983 or www.[employer]  · Call the toll free National Suicide Prevention Hotlines   · National Suicide Prevention Lifeline 724-692-MYMM (5725)  · National Hope Line Network 800-SUICIDE (185-1469)

## 2018-03-19 ENCOUNTER — APPOINTMENT (RX ONLY)
Dept: URBAN - METROPOLITAN AREA CLINIC 31 | Facility: CLINIC | Age: 63
Setting detail: DERMATOLOGY
End: 2018-03-19

## 2018-03-19 DIAGNOSIS — L81.4 OTHER MELANIN HYPERPIGMENTATION: ICD-10-CM

## 2018-03-19 DIAGNOSIS — D22 MELANOCYTIC NEVI: ICD-10-CM | Status: STABLE

## 2018-03-19 DIAGNOSIS — L90.5 SCAR CONDITIONS AND FIBROSIS OF SKIN: ICD-10-CM

## 2018-03-19 DIAGNOSIS — L82.1 OTHER SEBORRHEIC KERATOSIS: ICD-10-CM

## 2018-03-19 DIAGNOSIS — L20.89 OTHER ATOPIC DERMATITIS: ICD-10-CM

## 2018-03-19 DIAGNOSIS — L82.0 INFLAMED SEBORRHEIC KERATOSIS: ICD-10-CM

## 2018-03-19 PROBLEM — L20.84 INTRINSIC (ALLERGIC) ECZEMA: Status: ACTIVE | Noted: 2018-03-19

## 2018-03-19 PROBLEM — D22.72 MELANOCYTIC NEVI OF LEFT LOWER LIMB, INCLUDING HIP: Status: ACTIVE | Noted: 2018-03-19

## 2018-03-19 PROBLEM — E78.5 HYPERLIPIDEMIA, UNSPECIFIED: Status: ACTIVE | Noted: 2018-03-19

## 2018-03-19 PROBLEM — D22.5 MELANOCYTIC NEVI OF TRUNK: Status: ACTIVE | Noted: 2018-03-19

## 2018-03-19 PROCEDURE — ? OBSERVATION AND MEASURE

## 2018-03-19 PROCEDURE — 99213 OFFICE O/P EST LOW 20 MIN: CPT | Mod: 25

## 2018-03-19 PROCEDURE — ? COUNSELING

## 2018-03-19 PROCEDURE — ? DIAGNOSIS COMMENT

## 2018-03-19 PROCEDURE — ? COUNSELING: TOPICAL STEROIDS

## 2018-03-19 PROCEDURE — ? PRESCRIPTION

## 2018-03-19 PROCEDURE — ? PATIENT SPECIFIC COUNSELING

## 2018-03-19 PROCEDURE — 17111 DESTRUCTION B9 LESIONS 15/>: CPT

## 2018-03-19 PROCEDURE — ? LIQUID NITROGEN

## 2018-03-19 RX ORDER — TRIAMCINOLONE ACETONIDE 1 MG/G
CREAM TOPICAL
Qty: 1 | Refills: 2 | Status: ERX | COMMUNITY
Start: 2018-03-19

## 2018-03-19 RX ORDER — CLOBETASOL PROPIONATE 0.5 MG/G
CREAM TOPICAL
Qty: 1 | Refills: 1 | Status: ERX

## 2018-03-19 RX ADMIN — TRIAMCINOLONE ACETONIDE: 1 CREAM TOPICAL at 00:00

## 2018-03-19 ASSESSMENT — LOCATION DETAILED DESCRIPTION DERM
LOCATION DETAILED: LEFT DORSAL 3RD TOE
LOCATION DETAILED: RIGHT DISTAL PRETIBIAL REGION
LOCATION DETAILED: LEFT INFERIOR POSTAURICULAR SKIN
LOCATION DETAILED: PERIUMBILICAL SKIN
LOCATION DETAILED: RIGHT PROXIMAL MEDIAL CALF
LOCATION DETAILED: RIGHT SUPERIOR MEDIAL MIDBACK
LOCATION DETAILED: LEFT ANTERIOR PROXIMAL THIGH
LOCATION DETAILED: LEFT PROXIMAL PRETIBIAL REGION
LOCATION DETAILED: LEFT RIB CAGE
LOCATION DETAILED: EPIGASTRIC SKIN
LOCATION DETAILED: LEFT KNEE
LOCATION DETAILED: RIGHT RADIAL DORSAL HAND
LOCATION DETAILED: RIGHT POPLITEAL SKIN
LOCATION DETAILED: LEFT MID-UPPER BACK
LOCATION DETAILED: RIGHT RIB CAGE
LOCATION DETAILED: LEFT MEDIAL SUPERIOR CHEST
LOCATION DETAILED: RIGHT MID-UPPER BACK
LOCATION DETAILED: LEFT CENTRAL MALAR CHEEK
LOCATION DETAILED: RIGHT PROXIMAL CALF
LOCATION DETAILED: LEFT SUPERIOR LATERAL LOWER BACK
LOCATION DETAILED: LEFT ULNAR DORSAL HAND
LOCATION DETAILED: INFERIOR THORACIC SPINE
LOCATION DETAILED: LEFT POSTAURICULAR SKIN
LOCATION DETAILED: RIGHT MEDIAL PROXIMAL PRETIBIAL REGION
LOCATION DETAILED: LEFT DISTAL PRETIBIAL REGION
LOCATION DETAILED: LEFT INFERIOR LATERAL MIDBACK
LOCATION DETAILED: LEFT SUPERIOR MEDIAL LOWER BACK
LOCATION DETAILED: LEFT LATERAL SUPERIOR CHEST
LOCATION DETAILED: RIGHT MEDIAL INFERIOR CHEST
LOCATION DETAILED: RIGHT SUPERIOR LATERAL LOWER BACK
LOCATION DETAILED: LEFT DISTAL CALF
LOCATION DETAILED: LEFT LATERAL INFERIOR CHEST

## 2018-03-19 ASSESSMENT — LOCATION ZONE DERM
LOCATION ZONE: FACE
LOCATION ZONE: SCALP
LOCATION ZONE: LEG
LOCATION ZONE: HAND
LOCATION ZONE: TOE
LOCATION ZONE: TRUNK

## 2018-03-19 ASSESSMENT — LOCATION SIMPLE DESCRIPTION DERM
LOCATION SIMPLE: LEFT CHEEK
LOCATION SIMPLE: RIGHT LOWER BACK
LOCATION SIMPLE: POSTERIOR SCALP
LOCATION SIMPLE: LEFT CALF
LOCATION SIMPLE: LEFT KNEE
LOCATION SIMPLE: LEFT 3RD TOE
LOCATION SIMPLE: LEFT THIGH
LOCATION SIMPLE: RIGHT PRETIBIAL REGION
LOCATION SIMPLE: LEFT PRETIBIAL REGION
LOCATION SIMPLE: RIGHT HAND
LOCATION SIMPLE: RIGHT UPPER BACK
LOCATION SIMPLE: LEFT UPPER BACK
LOCATION SIMPLE: ABDOMEN
LOCATION SIMPLE: RIGHT POPLITEAL SKIN
LOCATION SIMPLE: CHEST
LOCATION SIMPLE: UPPER BACK
LOCATION SIMPLE: LEFT LOWER BACK
LOCATION SIMPLE: RIGHT CALF
LOCATION SIMPLE: LEFT HAND

## 2018-03-19 NOTE — HPI: SECONDARY COMPLAINT
How Severe Is This Condition?: moderate
Additional History: Lesion behind left knee for three months.

## 2018-03-19 NOTE — PROCEDURE: OBSERVATION
Morphology Per Location (Optional): macule
Detail Level: Detailed
Size Of Lesion: 7mm
Size Of Lesion: 6mm
Size Of Lesion: 5mm
Size Of Lesion: 4mm
Morphology Per Location (Optional): centrally dark papule
Morphology Per Location (Optional): dark papule
Morphology Per Location (Optional): dark macule
Size Of Lesion: 3mm
Size Of Lesion: 1mm
Size Of Lesion: 2mm

## 2018-05-24 ENCOUNTER — OFFICE VISIT (OUTPATIENT)
Dept: CARDIOLOGY | Facility: MEDICAL CENTER | Age: 63
End: 2018-05-24
Payer: COMMERCIAL

## 2018-05-24 VITALS
HEIGHT: 72 IN | OXYGEN SATURATION: 97 % | DIASTOLIC BLOOD PRESSURE: 72 MMHG | HEART RATE: 60 BPM | BODY MASS INDEX: 29.66 KG/M2 | SYSTOLIC BLOOD PRESSURE: 120 MMHG | WEIGHT: 219 LBS

## 2018-05-24 DIAGNOSIS — I10 ESSENTIAL HYPERTENSION, BENIGN: ICD-10-CM

## 2018-05-24 DIAGNOSIS — E78.00 HYPERCHOLESTEROLEMIA: ICD-10-CM

## 2018-05-24 DIAGNOSIS — G47.33 SLEEP APNEA, OBSTRUCTIVE: ICD-10-CM

## 2018-05-24 PROCEDURE — 99213 OFFICE O/P EST LOW 20 MIN: CPT | Performed by: INTERNAL MEDICINE

## 2018-05-24 RX ORDER — RANITIDINE 150 MG/1
150 TABLET ORAL 2 TIMES DAILY
COMMUNITY

## 2018-05-24 RX ORDER — TAMSULOSIN HYDROCHLORIDE 0.4 MG/1
0.4 CAPSULE ORAL
COMMUNITY

## 2018-05-24 RX ORDER — HYDROCODONE BITARTRATE AND ACETAMINOPHEN 10; 325 MG/1; MG/1
1-2 TABLET ORAL EVERY 6 HOURS PRN
COMMUNITY

## 2018-05-24 ASSESSMENT — ENCOUNTER SYMPTOMS
BACK PAIN: 1
BRUISES/BLEEDS EASILY: 1
NERVOUS/ANXIOUS: 1
HEARTBURN: 1

## 2018-05-24 NOTE — PROGRESS NOTES
Cardiology Follow-up Consultation Note    Date of note:    5/24/2018    Primary Care Provider: Denver J Miller, M.D.  Referring Provider: Kiel Freeman M.D.     Patient Name: Mercer , Mark Duane   YOB: 1955  MRN:              [id number]    Chief Complaint: Hypertension    History of Present Illness: Mark Duane Mercer is a 63 y.o. male whose current medical problems include NICK, on CPAP, hypertension, and  Dyslipidemia who is here for follow-up.    Last seen by Dr. Freeman on 11/20/2017.    Interim Events:  Measures his BP at home, but does not remember values.    Still using CPAP.    Cholesterol well controlled, last ldl <70.    Review of Systems   HENT: Positive for hearing loss.    Hematologic/Lymphatic: Bruises/bleeds easily.   Skin: Positive for itching and rash.   Musculoskeletal: Positive for back pain.   Gastrointestinal: Positive for heartburn.   Genitourinary: Positive for dysuria, frequency and urgency.   Psychiatric/Behavioral: The patient is nervous/anxious.      All other systems reviewed and discussed using a comprehensive questionnaire and are negative.       Past Medical History:   Diagnosis Date   • Arthritis     Multiple joints   • Back pain    • Bladder cancer (HCC) 6/12/2017    Dr. aBron, BCG    • Cancer (HCC)     squamous cell   • Cancer (HCC) 2017    bladder   • Colon polyp     Adenomatous, ~2005, not noted on most recent colonoscopy    • GERD (gastroesophageal reflux disease)    • Heart burn    • High cholesterol    • Hypercholesterolemia    • Hypertension     pt states well controlled on meds   • Obstructive uropathy    • Osteoarthritis    • Psychiatric problem     anxiety   • Sleep apnea, obstructive 11/21/2016    On CPAP, Dr. Villaseñor   • Snoring          Past Surgical History:   Procedure Laterality Date   • TRANS URETHRAL RESECTION BLADDER  1/25/2018    Procedure: TRANS URETHRAL RESECTION BLADDER- TUMOR WITH INTR- OP MITOMYCIN;  Surgeon: Jamaal Baorn,  M.D.;  Location: SURGERY [facility];  Service: Urology   • INGUINAL HERNIA LAPAROSCOPIC  1/15/2014    Performed by Christophe Isaac M.D. at SURGERY [facility]   • COLONOSCOPY      Complete - For Polyo Removal   • SPERMATOCELECTOMY      Surgery of Epididymis - Right   • TONSILLECTOMY AND ADENOIDECTOMY     • VASECTOMY      Surgery of Male Genitalia         Current Outpatient Prescriptions   Medication Sig Dispense Refill   • HYDROcodone/acetaminophen (NORCO)  MG Tab Take 1-2 Tabs by mouth every 6 hours as needed.     • raNITidine (ZANTAC) 150 MG Tab Take 150 mg by mouth 2 times a day.     • tamsulosin (FLOMAX) 0.4 MG capsule Take 0.4 mg by mouth ONE-HALF HOUR AFTER BREAKFAST.     • ascorbic acid (ASCORBIC ACID) 500 MG Tab Take 500 mg by mouth every day.     • atorvastatin (LIPITOR) 20 MG Tab TAKE 1 TABLET BY MOUTH EVERY DAY 90 Tab 3   • lisinopril (PRINIVIL, ZESTRIL) 40 MG tablet TAKE 1 TABLET BY MOUTH EVERY DAY 90 Tab 3   • finasteride (PROSCAR) 5 MG TABS Take 5 mg by mouth every day.     • omeprazole (PRILOSEC) 40 MG capsule Take 40 mg by mouth every day.     • Magnesium 250 MG TABS Take 2 Tabs by mouth every day. with chelated zinc     • SAW PALMETTO Take 1 Tab by mouth every day. With Zinc, Lycopene, and Pumpkin Seed     • aspirin 81 MG tablet Take 81 mg by mouth every day.     • Calcium Carbonate-Vitamin D (CALTRATE 600+D) 600-400 MG-UNIT TABS Take 1 Tab by mouth 3 times a day.     • docosahexanoic acid (FISH OIL) 1000 MG CAPS Take 1,000 mg by mouth 3 times a day.     • Misc Natural Products (TURMERIC CURCUMIN) CAPS Take 1 Cap by mouth every day.     • Cholecalciferol (VITAMIN D3) 2000 UNIT CAPS Take 1 Cap by mouth every day.     • vitamin e (VITAMIN E) 400 UNIT CAPS Take 400 Units by mouth 2 times a day.     • zolpidem (AMBIEN) 10 MG TABS Take 10 mg by mouth See Admin Instructions. Take 1/2 tablet 4-5 times per week     • Quercetin 250 MG TABS Take 1 Tab by mouth every day. With Vitamin C, 700mg          No current facility-administered medications for this visit.          Allergies   Allergen Reactions   • Penicillins      Unknown as a kid   • Septra [Bactrim] Rash     rash   • Sulfa Drugs Rash     rash         Family History   Problem Relation Age of Onset   • Heart Disease Mother    • Cancer Mother      Ovarian   • Other Father      Asbestosis   • Heart Disease Father      12/13/10 History of coumadin therapy         Social History     Social History   • Marital status:      Spouse name: N/A   • Number of children: N/A   • Years of education: N/A     Occupational History   • Not on file.     Social History Main Topics   • Smoking status: Never Smoker   • Smokeless tobacco: Never Used   • Alcohol use Yes      Comment: 3 a day    • Drug use: No   • Sexual activity: Yes     Partners: Female     Other Topics Concern   • Not on file     Social History Narrative   • No narrative on file         Physical Exam:  Ambulatory Vitals  Blood pressure 120/72, pulse 60, height 1.829 m (6'), weight 99.3 kg (219 lb), SpO2 97 %.   Oxygen Therapy:  Pulse Oximetry: 97 %  BP Readings from Last 4 Encounters:   05/24/18 120/72   01/25/18 125/83   11/20/17 122/78   06/12/17 130/80       Weight/BMI: Body mass index is 29.7 kg/m².  Wt Readings from Last 4 Encounters:   05/24/18 99.3 kg (219 lb)   01/25/18 98 kg (216 lb 0.8 oz)   11/20/17 97.1 kg (214 lb)   06/12/17 95.3 kg (210 lb)       General: Well appearing and in no apparent distress  Eyes: nl conjunctiva  ENT: OP clear, normal external appearance of nose and ears  Neck: JVP <8 cm H2O, no carotid bruits  Lungs: normal respiratory effort, CTAB  Heart: RRR, no murmurs, no rubs or gallops, no edema bilateral lower extremities. No LV/RV heave on cardiac palpatation. 2+ bilateral radial pulses.  2+ bilateral dp pulses.   Abdomen: soft, non tender, non distended, no masses, normal bowel sounds.  No HSM.  Extremities/MSK: no clubbing, no cyanosis  Neurological: No focal sensory  deficits  Psychiatric: Appropriate affect, A/O x 3, intact judgement and insight  Skin: Warm extremities    Lab Data Review:  Lab Results   Component Value Date/Time    CHOLSTRLTOT 140 11/07/2017 10:00 AM    CHOLSTRLTOT 144 09/03/2013 08:00 AM    LDL 66 11/07/2017 10:00 AM    LDL 72 09/03/2013 08:00 AM    HDL 47 11/07/2017 10:00 AM    HDL 56 09/03/2013 08:00 AM    TRIGLYCERIDE 134 11/07/2017 10:00 AM    TRIGLYCERIDE 81 09/03/2013 08:00 AM       Lab Results   Component Value Date/Time    SODIUM 137 01/16/2018 09:38 AM    POTASSIUM 4.1 01/16/2018 09:38 AM    CHLORIDE 104 01/16/2018 09:38 AM    CO2 26 01/16/2018 09:38 AM    GLUCOSE 82 01/16/2018 09:38 AM    BUN 13 01/16/2018 09:38 AM    CREATININE 0.89 01/16/2018 09:38 AM    CREATININE 0.94 03/22/2013 08:13 AM    BUNCREATRAT 15 11/07/2017 10:00 AM    BUNCREATRAT 20 03/22/2013 08:13 AM     Lab Results   Component Value Date/Time    ALKPHOSPHAT 64 01/16/2018 09:38 AM    ASTSGOT 20 01/16/2018 09:38 AM    ALTSGPT 25 01/16/2018 09:38 AM    TBILIRUBIN 0.8 01/16/2018 09:38 AM      Lab Results   Component Value Date/Time    WBC 6.8 01/16/2018 09:38 AM     No components found for: HBGA1C  No components found for: TROPONIN  No components found for: BNP      Cardiac Imaging and Procedures Review:    EKG dated 1/2018 : My personal interpretation is sinus bradycardia, otherwise normal.     Nuclear Perfusion Imaging (3/2016):    Myocardial Perfusion   Report   IMPRESSIONS   Normal stress test.    No evidence of significant jeopardized viable myocardium or prior myocardial    infarction.   Normal left ventricular size, ejection fraction, and wall motion.    Radiology test Review:  CXR: 2014  The heart is in the upper limits of normal for size.    The mediastinal contour appears within normal limits.  The central pulmonary vasculature appears normal.    The lungs appear well expanded bilaterally.  Bilateral lungs are clear.    No significant pleural effusions are identified.    The  bony structures appear age-appropriate.       Medical Decision Makin. Hypercholesterolemia  Well controlled  -continue lipitor, ok to stop fish oil as evidence for primary prevention is not well established.     2. Essential hypertension, benign  Well controlled  -continue lisinopril    3. Sleep apnea, obstructive  Continue CPAP    4. GERD - he is trying to get off medications, discussed dietary changes including decreasing alcohol intake so he can get off zantac, PPI, and magnesium.      Return in about 1 year (around 2019).      Serjio Thompson MD, [facility]  [facility], [address]  [address] [address]  Phone: [phone]  Fax: [phone]

## 2018-07-23 ENCOUNTER — APPOINTMENT (RX ONLY)
Dept: URBAN - METROPOLITAN AREA CLINIC 4 | Facility: CLINIC | Age: 63
Setting detail: DERMATOLOGY
End: 2018-07-23

## 2018-07-23 DIAGNOSIS — L30.9 DERMATITIS, UNSPECIFIED: ICD-10-CM

## 2018-07-23 DIAGNOSIS — L82.0 INFLAMED SEBORRHEIC KERATOSIS: ICD-10-CM

## 2018-07-23 PROBLEM — D48.5 NEOPLASM OF UNCERTAIN BEHAVIOR OF SKIN: Status: ACTIVE | Noted: 2018-07-23

## 2018-07-23 PROCEDURE — ? DIAGNOSIS COMMENT

## 2018-07-23 PROCEDURE — 99213 OFFICE O/P EST LOW 20 MIN: CPT | Mod: 25

## 2018-07-23 PROCEDURE — ? BIOPSY BY SHAVE METHOD

## 2018-07-23 PROCEDURE — ? LIQUID NITROGEN

## 2018-07-23 PROCEDURE — ? COUNSELING

## 2018-07-23 PROCEDURE — 11100: CPT | Mod: 59

## 2018-07-23 PROCEDURE — ? ADDITIONAL NOTES

## 2018-07-23 PROCEDURE — 11101: CPT

## 2018-07-23 PROCEDURE — 17110 DESTRUCTION B9 LES UP TO 14: CPT

## 2018-07-23 ASSESSMENT — LOCATION SIMPLE DESCRIPTION DERM
LOCATION SIMPLE: RIGHT CALF
LOCATION SIMPLE: LEFT UPPER ARM
LOCATION SIMPLE: LEFT PRETIBIAL REGION

## 2018-07-23 ASSESSMENT — LOCATION ZONE DERM
LOCATION ZONE: LEG
LOCATION ZONE: ARM

## 2018-07-23 ASSESSMENT — LOCATION DETAILED DESCRIPTION DERM
LOCATION DETAILED: RIGHT PROXIMAL CALF
LOCATION DETAILED: LEFT ANTERIOR PROXIMAL UPPER ARM
LOCATION DETAILED: LEFT PROXIMAL PRETIBIAL REGION

## 2018-07-23 NOTE — PROCEDURE: ADDITIONAL NOTES
Additional Notes: Patient to continue applying emollients. May continue TAC cream for maintenance BID twice weekly.

## 2018-07-23 NOTE — HPI: EVALUATION OF SKIN LESION(S)
How Severe Are Your Spot(S)?: mild
Have Your Spot(S) Been Treated In The Past?: has not been treated
Hpi Title: Evaluation of a Skin Lesion
Family Member: Cousin
How Severe Are Your Spot(S)?: moderate

## 2018-07-23 NOTE — PROCEDURE: LIQUID NITROGEN
Render Post-Care Instructions In Note?: yes
Post-Care Instructions: I reviewed with the patient in detail post-care instructions. Patient is to wear sunprotection, and avoid picking at any of the treated lesions. Pt may apply Vaseline to crusted or scabbing areas.
Medical Necessity Information: It is in your best interest to select a reason for this procedure from the list below. All of these items fulfill various CMS LCD requirements except the new and changing color options.
Include Z78.9 (Other Specified Conditions Influencing Health Status) As An Associated Diagnosis?: No
Consent: The patient's consent was obtained including but not limited to risks of crusting, scabbing, blistering, scarring, darker or lighter pigmentary change, recurrence, incomplete removal and infection.
Number Of Freeze-Thaw Cycles: 3 freeze-thaw cycles
Medical Necessity Clause: This procedure was medically necessary because the lesions that were treated were:
Detail Level: Detailed

## 2018-07-23 NOTE — PROCEDURE: BIOPSY BY SHAVE METHOD
Size Of Lesion In Cm: 0
Detail Level: Detailed
Wound Care: Petrolatum
Hemostasis: Drysol
Electrodesiccation And Curettage Text: The wound bed was treated with electrodesiccation and curettage after the biopsy was performed.
Destruction After The Procedure: No
Dressing: bandage
Depth Of Biopsy: dermis
Notification Instructions: Patient will be notified of biopsy results. However, patient instructed to call the office if not contacted within 2 weeks.
Biopsy Type: H and E
Lab: [id number]
Anesthesia Type: 1% lidocaine with 1:100,000 epinephrine and 408mcg clindamycin/ml and a 1:10 solution of 8.4% sodium bicarbonate
Electrodesiccation Text: The wound bed was treated with electrodesiccation after the biopsy was performed.
Post-Care Instructions: I reviewed with the patient in detail post-care instructions. Patient is to keep the biopsy site dry overnight, and then apply bacitracin twice daily until healed. Patient may apply hydrogen peroxide soaks to remove any crusting.
Type Of Destruction Used: Curettage
Consent: Written consent was obtained and risks were reviewed including but not limited to scarring, infection, bleeding, scabbing, incomplete removal, nerve damage and allergy to anesthesia.
Was A Bandage Applied: Yes
Cryotherapy Text: The wound bed was treated with cryotherapy after the biopsy was performed.
Silver Nitrate Text: The wound bed was treated with silver nitrate after the biopsy was performed.
Lab Facility: [id number]
Curettage Text: The wound bed was treated with curettage after the biopsy was performed.
Billing Type: Third-Party Bill
Anesthesia Volume In Cc: 0.5
Biopsy Method: Dermablade

## 2018-08-15 ENCOUNTER — APPOINTMENT (RX ONLY)
Dept: URBAN - METROPOLITAN AREA CLINIC 4 | Facility: CLINIC | Age: 63
Setting detail: DERMATOLOGY
End: 2018-08-15

## 2018-08-15 PROBLEM — C44.719 BASAL CELL CARCINOMA OF SKIN OF LEFT LOWER LIMB, INCLUDING HIP: Status: ACTIVE | Noted: 2018-08-15

## 2018-08-15 PROCEDURE — ? CURETTAGE AND DESTRUCTION

## 2018-08-15 PROCEDURE — 17263 DSTRJ MAL LES T/A/L 2.1-3.0: CPT

## 2018-08-15 NOTE — PROCEDURE: CURETTAGE AND DESTRUCTION
Additional Information: (Optional): The wound was cleaned, and a pressure dressing was applied.  The patient received detailed post-op instructions.
Total Volume (Ccs): 1
Bill For Surgical Tray: no
Anesthesia Type: 1% lidocaine with 1:100,000 epinephrine and 408mcg clindamycin/ml and a 1:10 solution of 8.4% sodium bicarbonate
Concentration (Mg/Ml Or Millions Of Plaque Forming Units/Cc): 0.01
Size Of Lesion In Cm: 2.3
What Was Performed First?: Curettage
Detail Level: Detailed
Consent was obtained from the patient. The risks, benefits and alternatives to therapy were discussed in detail. Specifically, the risks of infection, scarring, bleeding, prolonged wound healing, nerve injury, incomplete removal, allergy to anesthesia and recurrence were addressed. Alternatives to ED&C, such as: surgical removal and XRT were also discussed.  Prior to the procedure, the treatment site was clearly identified and confirmed by the patient. All components of Universal Protocol/PAUSE Rule completed.
Bill As A Line Item Or As Units: Line Item
Number Of Curettages: 3
Size Of Lesion After Curettage: 2.4
Post-Care Instructions: I reviewed with the patient in detail post-care instructions. Patient is to keep the area dry for 48 hours, and not to engage in any swimming until the area is healed. Should the patient develop any fevers, chills, bleeding, severe pain patient will contact the office immediately.
Cautery Type: electrodesiccation

## 2018-09-17 ENCOUNTER — APPOINTMENT (RX ONLY)
Dept: URBAN - METROPOLITAN AREA CLINIC 4 | Facility: CLINIC | Age: 63
Setting detail: DERMATOLOGY
End: 2018-09-17

## 2018-09-17 DIAGNOSIS — L81.4 OTHER MELANIN HYPERPIGMENTATION: ICD-10-CM

## 2018-09-17 DIAGNOSIS — L82.1 OTHER SEBORRHEIC KERATOSIS: ICD-10-CM

## 2018-09-17 DIAGNOSIS — L20.89 OTHER ATOPIC DERMATITIS: ICD-10-CM

## 2018-09-17 DIAGNOSIS — Z85.828 PERSONAL HISTORY OF OTHER MALIGNANT NEOPLASM OF SKIN: ICD-10-CM

## 2018-09-17 DIAGNOSIS — L82.0 INFLAMED SEBORRHEIC KERATOSIS: ICD-10-CM

## 2018-09-17 DIAGNOSIS — D22 MELANOCYTIC NEVI: ICD-10-CM | Status: STABLE

## 2018-09-17 DIAGNOSIS — L90.5 SCAR CONDITIONS AND FIBROSIS OF SKIN: ICD-10-CM

## 2018-09-17 PROBLEM — D22.5 MELANOCYTIC NEVI OF TRUNK: Status: ACTIVE | Noted: 2018-09-17

## 2018-09-17 PROBLEM — L20.84 INTRINSIC (ALLERGIC) ECZEMA: Status: ACTIVE | Noted: 2018-09-17

## 2018-09-17 PROBLEM — D22.72 MELANOCYTIC NEVI OF LEFT LOWER LIMB, INCLUDING HIP: Status: ACTIVE | Noted: 2018-09-17

## 2018-09-17 PROCEDURE — 17110 DESTRUCTION B9 LES UP TO 14: CPT

## 2018-09-17 PROCEDURE — ? COUNSELING: TOPICAL STEROIDS

## 2018-09-17 PROCEDURE — ? COUNSELING

## 2018-09-17 PROCEDURE — ? PRESCRIPTION

## 2018-09-17 PROCEDURE — ? ADDITIONAL NOTES

## 2018-09-17 PROCEDURE — ? PATIENT SPECIFIC COUNSELING

## 2018-09-17 PROCEDURE — ? LIQUID NITROGEN

## 2018-09-17 PROCEDURE — ? DIAGNOSIS COMMENT

## 2018-09-17 PROCEDURE — ? OBSERVATION AND MEASURE

## 2018-09-17 PROCEDURE — 99214 OFFICE O/P EST MOD 30 MIN: CPT | Mod: 25

## 2018-09-17 RX ORDER — CLOBETASOL PROPIONATE 0.5 MG/G
CREAM TOPICAL
Qty: 1 | Refills: 1 | Status: ERX

## 2018-09-17 ASSESSMENT — LOCATION SIMPLE DESCRIPTION DERM
LOCATION SIMPLE: RIGHT PRETIBIAL REGION
LOCATION SIMPLE: LEFT CHEEK
LOCATION SIMPLE: LEFT THIGH
LOCATION SIMPLE: ABDOMEN
LOCATION SIMPLE: LEFT CALF
LOCATION SIMPLE: LEFT PRETIBIAL REGION
LOCATION SIMPLE: RIGHT LOWER BACK
LOCATION SIMPLE: LEFT 3RD TOE
LOCATION SIMPLE: LEFT LOWER BACK
LOCATION SIMPLE: RIGHT HAND
LOCATION SIMPLE: RIGHT CALF
LOCATION SIMPLE: LEFT KNEE
LOCATION SIMPLE: LEFT HAND
LOCATION SIMPLE: LEFT SCALP
LOCATION SIMPLE: CHEST
LOCATION SIMPLE: SCALP

## 2018-09-17 ASSESSMENT — LOCATION DETAILED DESCRIPTION DERM
LOCATION DETAILED: LEFT CENTRAL PARIETAL SCALP
LOCATION DETAILED: LEFT DISTAL CALF
LOCATION DETAILED: RIGHT PROXIMAL CALF
LOCATION DETAILED: LEFT KNEE
LOCATION DETAILED: LEFT ANTERIOR DISTAL THIGH
LOCATION DETAILED: RIGHT MEDIAL DISTAL PRETIBIAL REGION
LOCATION DETAILED: RIGHT RIB CAGE
LOCATION DETAILED: LEFT RIB CAGE
LOCATION DETAILED: LEFT MEDIAL FRONTAL SCALP
LOCATION DETAILED: LEFT SUPERIOR FRONTAL SCALP
LOCATION DETAILED: LEFT LATERAL INFERIOR CHEST
LOCATION DETAILED: RIGHT SUPERIOR LATERAL LOWER BACK
LOCATION DETAILED: LEFT INFERIOR LATERAL MIDBACK
LOCATION DETAILED: EPIGASTRIC SKIN
LOCATION DETAILED: RIGHT MEDIAL INFERIOR CHEST
LOCATION DETAILED: LEFT SUPERIOR LATERAL LOWER BACK
LOCATION DETAILED: RIGHT RADIAL DORSAL HAND
LOCATION DETAILED: LEFT LATERAL SUPERIOR CHEST
LOCATION DETAILED: LEFT SUPERIOR MEDIAL LOWER BACK
LOCATION DETAILED: LEFT CENTRAL MALAR CHEEK
LOCATION DETAILED: LEFT DORSAL 3RD TOE
LOCATION DETAILED: LEFT ULNAR DORSAL HAND
LOCATION DETAILED: LEFT PROXIMAL PRETIBIAL REGION
LOCATION DETAILED: LEFT DISTAL PRETIBIAL REGION
LOCATION DETAILED: LEFT ANTERIOR PROXIMAL THIGH
LOCATION DETAILED: PERIUMBILICAL SKIN
LOCATION DETAILED: RIGHT DISTAL PRETIBIAL REGION

## 2018-09-17 ASSESSMENT — LOCATION ZONE DERM
LOCATION ZONE: TRUNK
LOCATION ZONE: LEG
LOCATION ZONE: HAND
LOCATION ZONE: FACE
LOCATION ZONE: SCALP
LOCATION ZONE: TOE

## 2018-09-17 NOTE — PROCEDURE: ADDITIONAL NOTES
Additional Notes: May consider performing biopsy if lesions does not heal after patient apply’s his prescription cream. Patient will keep track and will call us if lesion does not resolve.
Detail Level: Simple
Additional Notes: Treated with ED&C

## 2018-09-17 NOTE — PROCEDURE: LIQUID NITROGEN
Detail Level: Detailed
Post-Care Instructions: I reviewed with the patient in detail post-care instructions. Patient is to wear sunprotection, and avoid picking at any of the treated lesions. Pt may apply Vaseline to crusted or scabbing areas.
Add 52 Modifier (Optional): no
Include Z78.9 (Other Specified Conditions Influencing Health Status) As An Associated Diagnosis?: Yes
Medical Necessity Clause: This procedure was medically necessary because the lesions that were treated were:
Medical Necessity Information: It is in your best interest to select a reason for this procedure from the list below. All of these items fulfill various CMS LCD requirements except the new and changing color options.
Consent: The patient's consent was obtained including but not limited to risks of crusting, scabbing, blistering, scarring, darker or lighter pigmentary change, recurrence, incomplete removal and infection.

## 2018-09-17 NOTE — PROCEDURE: DIAGNOSIS COMMENT
Detail Level: Detailed
Comment: See Body Map
Detail Level: Simple
Comment: History of skin cancer
Comment: Superficial; [id number]

## 2018-09-17 NOTE — PROCEDURE: OBSERVATION
Size Of Lesion: 7mm
Detail Level: Detailed
Morphology Per Location (Optional): macule
Size Of Lesion: 6mm
Size Of Lesion: 5mm
Size Of Lesion: 4mm
Morphology Per Location (Optional): centrally dark papule
Morphology Per Location (Optional): dark papule
Morphology Per Location (Optional): dark macule
Size Of Lesion: 3mm
Size Of Lesion: 1mm
Size Of Lesion: 2mm

## 2018-09-17 NOTE — PROCEDURE: PATIENT SPECIFIC COUNSELING
Detail Level: Simple
May use 1 % otc hydrocortisone to the face, apply to affected areas twice a day for up to 2 weeks then discontinue.
Informed patient is watch his rash closely. If rash does not heal, we may consider performing a biospy to rule out Basal Cell Carcinoma. Gave patients MA’s direct line.

## 2018-09-20 ENCOUNTER — HOSPITAL ENCOUNTER (OUTPATIENT)
Dept: RADIOLOGY | Facility: MEDICAL CENTER | Age: 63
End: 2018-09-20
Attending: FAMILY MEDICINE
Payer: COMMERCIAL

## 2018-09-20 ENCOUNTER — OFFICE VISIT (OUTPATIENT)
Dept: URGENT CARE | Facility: PHYSICIAN GROUP | Age: 63
End: 2018-09-20
Payer: COMMERCIAL

## 2018-09-20 VITALS
HEART RATE: 66 BPM | RESPIRATION RATE: 16 BRPM | TEMPERATURE: 98.4 F | OXYGEN SATURATION: 98 % | DIASTOLIC BLOOD PRESSURE: 62 MMHG | BODY MASS INDEX: 29.12 KG/M2 | HEIGHT: 72 IN | SYSTOLIC BLOOD PRESSURE: 122 MMHG | WEIGHT: 215 LBS

## 2018-09-20 DIAGNOSIS — S67.02XA CRUSHING INJURY OF LEFT THUMB, INITIAL ENCOUNTER: ICD-10-CM

## 2018-09-20 DIAGNOSIS — H61.23 BILATERAL IMPACTED CERUMEN: ICD-10-CM

## 2018-09-20 DIAGNOSIS — S60.10XA SUBUNGUAL HEMATOMA OF FINGER, INITIAL ENCOUNTER: ICD-10-CM

## 2018-09-20 PROCEDURE — 73140 X-RAY EXAM OF FINGER(S): CPT | Mod: LT

## 2018-09-20 PROCEDURE — 11740 EVACUATION SUBUNGUAL HMTMA: CPT | Mod: 51 | Performed by: FAMILY MEDICINE

## 2018-09-20 PROCEDURE — 69210 REMOVE IMPACTED EAR WAX UNI: CPT | Performed by: FAMILY MEDICINE

## 2018-09-20 PROCEDURE — 99214 OFFICE O/P EST MOD 30 MIN: CPT | Mod: 25 | Performed by: FAMILY MEDICINE

## 2018-09-20 NOTE — PROGRESS NOTES
Subjective:      Chief Complaint   Patient presents with   • Finger Injury     smashed thumb in car door,  x 1day, both ears plugged x 10 days         HPI       C/o constant, throbbing left thumb  pain x 6 hrs, after he smashed in car door.  Denies any trauma.   Pt has not tried anything for the pain.  Pain is worse with bending it.   Denies fevers, chills, redness, swelling.   States  Pain is constant, throbbing and thumb is TTP.           #2.  C/o bilateral cerumen impaction      Social History   Substance Use Topics   • Smoking status: Never Smoker   • Smokeless tobacco: Never Used   • Alcohol use 8.4 oz/week     14 Standard drinks or equivalent per week             Current Outpatient Prescriptions on File Prior to Visit   Medication Sig Dispense Refill   • ascorbic acid (ASCORBIC ACID) 500 MG Tab Take 500 mg by mouth every day.     • atorvastatin (LIPITOR) 20 MG Tab TAKE 1 TABLET BY MOUTH EVERY DAY 90 Tab 3   • lisinopril (PRINIVIL, ZESTRIL) 40 MG tablet TAKE 1 TABLET BY MOUTH EVERY DAY 90 Tab 3   • finasteride (PROSCAR) 5 MG TABS Take 5 mg by mouth every day.     • omeprazole (PRILOSEC) 40 MG capsule Take 40 mg by mouth every day.     • Magnesium 250 MG TABS Take 2 Tabs by mouth every day. with chelated zinc     • SAW PALMETTO Take 1 Tab by mouth every day. With Zinc, Lycopene, and Pumpkin Seed     • aspirin 81 MG tablet Take 81 mg by mouth every day.     • Calcium Carbonate-Vitamin D (CALTRATE 600+D) 600-400 MG-UNIT TABS Take 1 Tab by mouth 3 times a day.     • Cholecalciferol (VITAMIN D3) 2000 UNIT CAPS Take 1 Cap by mouth every day.     • zolpidem (AMBIEN) 10 MG TABS Take 10 mg by mouth See Admin Instructions. Take 1/2 tablet 4-5 times per week     • HYDROcodone/acetaminophen (NORCO)  MG Tab Take 1-2 Tabs by mouth every 6 hours as needed.     • raNITidine (ZANTAC) 150 MG Tab Take 150 mg by mouth 2 times a day.     • tamsulosin (FLOMAX) 0.4 MG capsule Take 0.4 mg by mouth ONE-HALF HOUR AFTER BREAKFAST.      • docosahexanoic acid (FISH OIL) 1000 MG CAPS Take 1,000 mg by mouth 3 times a day.     • Misc Natural Products (TURMERIC CURCUMIN) CAPS Take 1 Cap by mouth every day.     • vitamin e (VITAMIN E) 400 UNIT CAPS Take 400 Units by mouth 2 times a day.       No current facility-administered medications on file prior to visit.          Past Medical History:   Diagnosis Date   • Bladder cancer (HCC) 6/12/2017    Dr. Baron, [facility]    • Cancer (HCC) 2017    bladder   • Sleep apnea, obstructive 11/21/2016    On CPAP, Dr. Villaseñor   • Arthritis     Multiple joints   • Back pain    • Cancer (HCC)     squamous cell   • Colon polyp     Adenomatous, ~2005, not noted on most recent colonoscopy    • GERD (gastroesophageal reflux disease)    • Heart burn    • Hypercholesterolemia    • Hypertension     pt states well controlled on meds   • Obstructive uropathy    • Osteoarthritis    • Psychiatric problem     anxiety           Review of Systems:  Review of Systems   Constitutional: Negative for fever, chills.   HENT: no neck pain, headache or dizziness  Eyes: denies vision changes or discharge  Respiratory: no cough, congestion, SOB  Cardiovascular: denies palpations, chest pain   Gastrointestinal: denies diarrhea, abdominal pain or constipation.  No blood in stool.  Musculoskeletal: denies back pain or neck pain    Skin: no itching or rash  Neurological: No numbness or tingling.   Psych - denies depression, anxiety   - Denies dysuria, frequency or discharge  10 point ROS otherwise negative, except per HPI         Objective:     Blood pressure 122/62, pulse 66, temperature 36.9 °C (98.4 °F), temperature source Temporal, resp. rate 16, height 1.829 m (6'), weight 97.5 kg (215 lb), SpO2 98 %.    Physical Exam   Constitutional: pt is oriented to person, place, and time. Pt appears well-developed and well-nourished. No distress.   HENT:   + bilat cerumen impaction  Head: Normocephalic and atraumatic.   Eyes: Conjunctivae are normal.    Cardiovascular: Normal rate.  RRR.  No murmer   Pulmonary/Chest: Effort normal.  CTAB  Musculoskeletal:        Left thumb:  There is a large Subungual hematoma.    + TTP over distal thumb     Normal sensation noted. Normal strength noted.   Neurological: pt is alert and oriented to person, place, and time.   Skin: Skin is warm. Pt is not diaphoretic. No erythema.   Nursing note and vitals reviewed.              Assessment/Plan:     1. Bilateral impacted cerumen  Ear canals were impacted with cerumen. Ear lavage was performed with normal saline, afterward impacted cerumen was removed with speculum. Subsequent to this, tympanic membranes were visualized and were normal.       2. Crushing injury of left thumb, initial encounter    X-rays were personally reviewed by myself.   There is no fracture.       3.  Subungual hematoma        Area was then prepped in the usual sterile fashion.   Trepanation performed with 18g needle.   There was good release of blood and pain reduction.       .  Area was then cleansed and dressed.    Wound care instructions and ER precautions given.        - DX-FINGER(S) 2+ LEFT; Future

## 2018-10-09 DIAGNOSIS — I10 ESSENTIAL HYPERTENSION, BENIGN: ICD-10-CM

## 2018-10-09 RX ORDER — LISINOPRIL 40 MG/1
40 TABLET ORAL
Qty: 90 TAB | Refills: 3 | Status: SHIPPED | OUTPATIENT
Start: 2018-10-09 | End: 2018-11-07 | Stop reason: SDUPTHER

## 2018-10-15 DIAGNOSIS — E78.00 HYPERCHOLESTEROLEMIA: ICD-10-CM

## 2018-10-15 RX ORDER — ATORVASTATIN CALCIUM 20 MG/1
20 TABLET, FILM COATED ORAL
Qty: 90 TAB | Refills: 3 | Status: SHIPPED | OUTPATIENT
Start: 2018-10-15 | End: 2019-06-12 | Stop reason: SDUPTHER

## 2018-11-07 DIAGNOSIS — I10 ESSENTIAL HYPERTENSION, BENIGN: ICD-10-CM

## 2018-11-07 RX ORDER — LISINOPRIL 40 MG/1
40 TABLET ORAL
Qty: 90 TAB | Refills: 2 | Status: SHIPPED | OUTPATIENT
Start: 2018-11-07 | End: 2019-06-12 | Stop reason: SDUPTHER

## 2019-06-12 DIAGNOSIS — E78.00 HYPERCHOLESTEROLEMIA: ICD-10-CM

## 2019-06-12 DIAGNOSIS — I10 ESSENTIAL HYPERTENSION, BENIGN: ICD-10-CM

## 2019-06-13 RX ORDER — LISINOPRIL 40 MG/1
TABLET ORAL
Qty: 90 TAB | Refills: 3 | Status: SHIPPED | OUTPATIENT
Start: 2019-06-13

## 2019-06-13 RX ORDER — ATORVASTATIN CALCIUM 20 MG/1
TABLET, FILM COATED ORAL
Qty: 90 TAB | Refills: 3 | Status: SHIPPED | OUTPATIENT
Start: 2019-06-13

## 2019-08-30 NOTE — PROCEDURE: LIQUID NITROGEN
Include Z78.9 (Other Specified Conditions Influencing Health Status) As An Associated Diagnosis?: Yes
Add 52 Modifier (Optional): no
Detail Level: Detailed
Post-Care Instructions: I reviewed with the patient in detail post-care instructions. Patient is to wear sunprotection, and avoid picking at any of the treated lesions. Pt may apply Vaseline to crusted or scabbing areas.
Consent: The patient's consent was obtained including but not limited to risks of crusting, scabbing, blistering, scarring, darker or lighter pigmentary change, recurrence, incomplete removal and infection.
Medical Necessity Information: It is in your best interest to select a reason for this procedure from the list below. All of these items fulfill various CMS LCD requirements except the new and changing color options.
Medical Necessity Clause: This procedure was medically necessary because the lesions that were treated were:
+Minimal epigastric tenderness. Abdomen soft, no guarding.

## (undated) DEVICE — MASK ANESTHESIA ADULT  - (100/CA)

## (undated) DEVICE — CONTAINER SPECIMEN BAG OR - STERILE 4 OZ W/LID (100EA/CA)

## (undated) DEVICE — GLOVE BIOGEL SZ 7.5 SURGICAL PF LTX - (50PR/BX 4BX/CA)

## (undated) DEVICE — PROTECTOR ULNA NERVE - (36PR/CA)

## (undated) DEVICE — GOWN SURGICAL X-LARGE ULTRA - FILM-REINFORCED (20/CA)

## (undated) DEVICE — PACK CYSTOSCOPY III - (8/CA)

## (undated) DEVICE — WATER IRRIG. STER. 1500 ML - (9/CA)

## (undated) DEVICE — BAG URODRAIN WITH TUBING - (20/CA)

## (undated) DEVICE — GOWN SURGEONS X-LARGE - DISP. (30/CA)

## (undated) DEVICE — TOWELS CLOTH SURGICAL - (4/PK 20PK/CA)

## (undated) DEVICE — GOWN WARMING STANDARD FLEX - (30/CA)

## (undated) DEVICE — CORD RESECTO DISP ACT DAC-1 FOR USA RESECTOSCOPE (12EA/BX)

## (undated) DEVICE — HEAD HOLDER JUNIOR/ADULT

## (undated) DEVICE — TUBING CLEARLINK DUO-VENT - C-FLO (48EA/CA)

## (undated) DEVICE — PACK SINGLE BASIN - (6/CA)

## (undated) DEVICE — JELLY, KY 2 0Z STERILE

## (undated) DEVICE — CUTTING LOOP 24FR - 10/BX OLD #MLE-24-012

## (undated) DEVICE — ELECTRODE DUAL RETURN W/ CORD - (50/PK)

## (undated) DEVICE — COVER FOOT UNIVERSAL DISP. - (25EA/CA)

## (undated) DEVICE — CATHETER URETHRAL FOLEY SILICONE OD18 FR 10 ML (10EA/CA)

## (undated) DEVICE — EVACUATOR BLADDER ELLIK - (10/BX)

## (undated) DEVICE — GLOVE BIOGEL PI INDICATOR SZ 7.0 SURGICAL PF LF - (50/BX 4BX/CA)

## (undated) DEVICE — PLUG CATHETER DRAIN TUBE PROTECTOR CAP

## (undated) DEVICE — SET IRRIGATION CYSTOSCOPY Y-TYPE L81 IN (20EA/CA)

## (undated) DEVICE — SPONGE GAUZESTER 4 X 4 4PLY - (128PK/CA)

## (undated) DEVICE — LACTATED RINGERS INJ 1000 ML - (14EA/CA 60CA/PF)

## (undated) DEVICE — TUBE CONNECT SUCTION CLEAR 120 X 1/4" (50EA/CA)"

## (undated) DEVICE — SET EXTENSION WITH 2 PORTS (48EA/CA) ***PART #2C8610 IS A SUBSTITUTE*****

## (undated) DEVICE — SET LEADWIRE 5 LEAD BEDSIDE DISPOSABLE ECG (1SET OF 5/EA)

## (undated) DEVICE — KIT ROOM DECONTAMINATION

## (undated) DEVICE — MASK AIRWAY FLEXIBLE SINGLE-USE SIZE 5 ADULTS (10EA/BX)

## (undated) DEVICE — SUCTION INSTRUMENT YANKAUER BULBOUS TIP W/O VENT (50EA/CA)

## (undated) DEVICE — CONNECTOR HOSE NEPTUNE FOR CYSTO ROOM

## (undated) DEVICE — GOWN SURGICAL XX-LARGE - (28EA/CA) SIRUS NON REINFORCED

## (undated) DEVICE — KIT ANESTHESIA W/CIRCUIT & 3/LT BAG W/FILTER (20EA/CA)

## (undated) DEVICE — ELECTRODE 850 FOAM ADHESIVE - HYDROGEL RADIOTRNSPRNT (50/PK)

## (undated) DEVICE — SLEEVE, VASO, THIGH, MED

## (undated) DEVICE — BAG DRAINAGE URINARY CLOSED 2000ML (20EA/CA)

## (undated) DEVICE — WATER IRRIG. STER 3000 ML - (4/CA)

## (undated) DEVICE — NEPTUNE 4 PORT MANIFOLD - (20/PK)

## (undated) DEVICE — SENSOR SPO2 NEO LNCS ADHESIVE (20/BX) SEE USER NOTES